# Patient Record
Sex: FEMALE | Race: WHITE | Employment: FULL TIME | URBAN - METROPOLITAN AREA
[De-identification: names, ages, dates, MRNs, and addresses within clinical notes are randomized per-mention and may not be internally consistent; named-entity substitution may affect disease eponyms.]

---

## 2022-03-18 PROBLEM — L90.0 LICHEN SCLEROSUS: Status: ACTIVE | Noted: 2021-10-22

## 2022-06-02 ENCOUNTER — PATIENT MESSAGE (OUTPATIENT)
Dept: OBGYN CLINIC | Age: 22
End: 2022-06-02

## 2022-06-08 ENCOUNTER — OFFICE VISIT (OUTPATIENT)
Dept: OBGYN CLINIC | Age: 22
End: 2022-06-08
Payer: MEDICAID

## 2022-06-08 VITALS
SYSTOLIC BLOOD PRESSURE: 120 MMHG | DIASTOLIC BLOOD PRESSURE: 68 MMHG | BODY MASS INDEX: 26.5 KG/M2 | WEIGHT: 135 LBS | HEIGHT: 60 IN

## 2022-06-08 DIAGNOSIS — N92.6 MISSED MENSES: Primary | ICD-10-CM

## 2022-06-08 DIAGNOSIS — Z3A.08 8 WEEKS GESTATION OF PREGNANCY: ICD-10-CM

## 2022-06-08 LAB
ABO + RH BLD: NORMAL
BLOOD GROUP ANTIBODIES SERPL: NORMAL

## 2022-06-08 PROCEDURE — 99214 OFFICE O/P EST MOD 30 MIN: CPT | Performed by: OBSTETRICS & GYNECOLOGY

## 2022-06-08 PROCEDURE — 76830 TRANSVAGINAL US NON-OB: CPT | Performed by: OBSTETRICS & GYNECOLOGY

## 2022-06-08 ASSESSMENT — ENCOUNTER SYMPTOMS
EYES NEGATIVE: 1
RESPIRATORY NEGATIVE: 1
SHORTNESS OF BREATH: 0
GASTROINTESTINAL NEGATIVE: 1
ABDOMINAL PAIN: 0
ALLERGIC/IMMUNOLOGIC NEGATIVE: 1
COUGH: 0
BLOOD IN STOOL: 0

## 2022-06-08 NOTE — PROGRESS NOTES
Michi Guzman presents for pregnancy confirmation and establish CARMEL. Patient's last menstrual period was 04/01/2022 (approximate). , she reports regular,  cycles. This pregnancy was planned. Just stopped OCs in March. Works at [employer], just got a new job in [place] which is closer to her home. Her Ob history is as follows:  # 1 - Date: None, Sex: None, Weight: None, GA: None, Delivery: None, Apgar1: None, Apgar5: None, Living: None, Birth Comments: None      Past medical History: Active Ambulatory Problems     Diagnosis Date Noted    Lichen sclerosus 04/10/8802     Resolved Ambulatory Problems     Diagnosis Date Noted    No Resolved Ambulatory Problems     Past Medical History:   Diagnosis Date    Abnormal Papanicolaou smear of cervix 07/17/2017    HSV-2 (herpes simplex virus 2) infection        Current Outpatient Medications on File Prior to Visit   Medication Sig Dispense Refill    Prenatal MV & Min w/FA-DHA (ONE A DAY PRENATAL PO) Take by mouth      acyclovir (ZOVIRAX) 800 MG tablet Take 800 mg by mouth 5 times daily (Patient not taking: Reported on 6/8/2022)       No current facility-administered medications on file prior to visit. Past Surgical:  has a past surgical history that includes Tonsillectomy and LEEP (09/2017). Nigel Hull  reports that she has never smoked. She has never used smokeless tobacco. She reports previous alcohol use. She reports that she does not use drugs. Review of Systems   Constitutional: Negative. Negative for unexpected weight change. HENT: Negative. Eyes: Negative. Respiratory: Negative. Negative for cough and shortness of breath. Cardiovascular: Negative. Negative for chest pain and palpitations. Gastrointestinal: Negative. Negative for abdominal pain and blood in stool. Endocrine: Negative. Genitourinary: Negative. Negative for difficulty urinating, dysuria, menstrual problem, pelvic pain and urgency. Musculoskeletal: Negative. Skin: Negative. Allergic/Immunologic: Negative. Neurological: Negative. Hematological: Negative. Psychiatric/Behavioral: Negative. Negative for behavioral problems and confusion. All other systems reviewed and are negative. Blood pressure 120/68, height 5' (1.524 m), weight 135 lb (61.2 kg), last menstrual period 04/01/2022. Body mass index is 26.37 kg/m². Physical Exam  Constitutional:       General: She is not in acute distress. Appearance: Normal appearance. HENT:      Head: Normocephalic and atraumatic. Eyes:      Extraocular Movements: Extraocular movements intact. Pupils: Pupils are equal, round, and reactive to light. Pulmonary:      Effort: Pulmonary effort is normal. No respiratory distress. Skin:     General: Skin is warm and dry. Neurological:      General: No focal deficit present. Mental Status: She is alert and oriented to person, place, and time. Psychiatric:         Mood and Affect: Mood normal.         Behavior: Behavior normal.         Thought Content: Thought content normal.         Judgment: Judgment normal.           [address] - OB ULTRASOUND REPORT     Patient Information  6/8/2022  Mike Preciado 2000     22 y.o.    [facility]: Patient's last menstrual period was 04/01/2022 (approximate). GA by LMP:  9 Weeks   5 Days    Indication for this exam: Confirmation of Pregnancy and CARMEL    Findings: Images reviewed by me personally. Number of Fetuses: 1  Cardiac Rate: 172 bpm    GA by US:   8 Wks: 5 Days:    Other pertinent findings: CL>4cm    Summary:  Intrauterine Pregnancy    Estimated GA: 8 Wks  5 Days  Recommended EDC: January 13th, 2023      Azalea Jacobsen was seen today for amenorrhea. Diagnoses and all orders for this visit:    Missed menses  -     AMB POC US, TRANSVAGINAL    8 weeks gestation of pregnancy - change CARMEL to 1/13/23  -     ABO/Rh; Future  -     Antibody Screen;  Future  -     CBC with Auto Differential; Future  -     RPR; Future  -     Hepatitis B Surface Antigen; Future  -     Rubella antibody, IgG; Future  -     HIV 1/2 Ag/Ab, 4TH Generation,W Rflx Confirm; Future  -     Culture, Urine; Future  -     Varicella Zoster Antibody, IgG; Future          -PN labs today  -Continue PNV with at least 125WYL Folic acid QD  -F6/PWPWAJ (Diclegis) if nausea/vomiting  -Calcium:  recommend 1000mg/QD  (500 in 2 Tums), milk, cheese, yogurt, leafy green vegetables  -Iron:  30mg every day (red meat, dark beans)  -Counseled on appropriate foods to avoid in pregnancy:   -unpasteurized milk/cheeses   -hot dogs, luncheon meats, cold cuts unless heated until steaming    -refrigerated correia and meat spreads   -raw/undercooked seafood, eggs, meat  -Avoid litter box if have cat(s) , avoid handling game meat  -See follow up instructions     Return in about 1 week (around 6/15/2022) for NOB w RN --- DIRK lester/ MD in 4 weeks.          Physician: Sommer Arceo MD  June 8, 2022  11:35 AM

## 2022-06-10 LAB — VZV IGG SER IA-ACNC: 900 INDEX

## 2022-06-11 LAB
BACTERIA SPEC CULT: NORMAL
SERVICE CMNT-IMP: NORMAL

## 2022-06-12 LAB
BASOPHILS # BLD: 0.1 K/UL (ref 0–0.2)
BASOPHILS NFR BLD: 1 % (ref 0–2)
DIFFERENTIAL METHOD BLD: ABNORMAL
EOSINOPHIL # BLD: 0.2 K/UL (ref 0–0.8)
EOSINOPHIL NFR BLD: 2 % (ref 0.5–7.8)
ERYTHROCYTE [DISTWIDTH] IN BLOOD BY AUTOMATED COUNT: 12.7 % (ref 11.9–14.6)
HBV SURFACE AG SER QL: NONREACTIVE
HCT VFR BLD AUTO: 39.8 % (ref 35.8–46.3)
HGB BLD-MCNC: 13.4 G/DL (ref 11.7–15.4)
HIV 1+2 AB+HIV1 P24 AG SERPL QL IA: NONREACTIVE
HIV 1/2 RESULT COMMENT: NORMAL
IMM GRANULOCYTES # BLD AUTO: 0.1 K/UL (ref 0–0.5)
IMM GRANULOCYTES NFR BLD AUTO: 1 % (ref 0–5)
LYMPHOCYTES # BLD: 1.8 K/UL (ref 0.5–4.6)
LYMPHOCYTES NFR BLD: 23 % (ref 13–44)
MCH RBC QN AUTO: 31.2 PG (ref 26.1–32.9)
MCHC RBC AUTO-ENTMCNC: 33.7 G/DL (ref 31.4–35)
MCV RBC AUTO: 92.8 FL (ref 79.6–97.8)
MONOCYTES # BLD: 0.5 K/UL (ref 0.1–1.3)
MONOCYTES NFR BLD: 7 % (ref 4–12)
NEUTS SEG # BLD: 5.2 K/UL (ref 1.7–8.2)
NEUTS SEG NFR BLD: 66 % (ref 43–78)
NRBC # BLD: 0 K/UL (ref 0–0.2)
PLATELET # BLD AUTO: 152 K/UL (ref 150–450)
PMV BLD AUTO: 12.8 FL (ref 9.4–12.3)
RBC # BLD AUTO: 4.29 M/UL (ref 4.05–5.2)
RPR SER QL: NONREACTIVE
RUBV IGG SERPL IA-ACNC: 43.8 IU/ML (ref 0–50)
WBC # BLD AUTO: 7.9 K/UL (ref 4.3–11.1)

## 2022-06-17 ENCOUNTER — NURSE ONLY (OUTPATIENT)
Dept: OBGYN CLINIC | Age: 22
End: 2022-06-17

## 2022-06-17 VITALS — WEIGHT: 136 LBS | BODY MASS INDEX: 26.56 KG/M2

## 2022-06-17 NOTE — PROGRESS NOTES
NOB consult with pt. Labs today: nipt. Offered pt the option of CF, SMA, and Fragile X carrier testing. Pt declines testing. Offered pt the option of genetic screening (1st screen vs Tetra vs nipt). Pt desires nipt. Drawn and sent to [facility]. Instructed pt on exercise/nutrition in pregnancy. Reviewed [facility] preg book. Advised pt on using [facility] for hospital needs and [facility] L&D for pregnancy related emergencies. Pt states understanding. NOB forms signed, scanned, and given to pt. Medical Hx: abnormal pap, genital herpes    Surgical Hx: LEEP, tonsillectomy    Last Pap: 10/22/21  WNL    Pt OB c/o: none.

## 2022-07-06 NOTE — TELEPHONE ENCOUNTER
I spoke with [facility] and they did not have enough DNA to run the sample. She will need to have it redrawn unfortunately.

## 2022-07-14 ENCOUNTER — ROUTINE PRENATAL (OUTPATIENT)
Dept: OBGYN CLINIC | Age: 22
End: 2022-07-14
Payer: MEDICAID

## 2022-07-14 VITALS — WEIGHT: 142 LBS | DIASTOLIC BLOOD PRESSURE: 76 MMHG | BODY MASS INDEX: 27.73 KG/M2 | SYSTOLIC BLOOD PRESSURE: 124 MMHG

## 2022-07-14 DIAGNOSIS — Z34.02 ENCOUNTER FOR PRENATAL CARE IN SECOND TRIMESTER OF FIRST PREGNANCY: Primary | ICD-10-CM

## 2022-07-14 DIAGNOSIS — Z11.3 SCREENING FOR STD (SEXUALLY TRANSMITTED DISEASE): ICD-10-CM

## 2022-07-14 DIAGNOSIS — Z34.01 ENCOUNTER FOR SUPERVISION OF NORMAL FIRST PREGNANCY IN FIRST TRIMESTER: ICD-10-CM

## 2022-07-14 PROBLEM — Z34.00 NORMAL PREGNANCY, FIRST: Status: ACTIVE | Noted: 2022-07-14

## 2022-07-14 LAB — NIPT, EXTERNAL: NORMAL

## 2022-07-14 PROCEDURE — 99204 OFFICE O/P NEW MOD 45 MIN: CPT | Performed by: OBSTETRICS & GYNECOLOGY

## 2022-07-14 NOTE — PROGRESS NOTES
Carli Sanders  presents for [address] [place] [address] w/ GC/CT/T. . 13w6d. PL and MFM notes reviewed as applicable. Taking Prenatal Vitamins: Yes  She is noticing:  no unusual complaints. Repeat NIPT today due to not enough DNA with last sample.    NOB today    Normal pregnancy, first   Well-controlled, continue current medications

## 2022-07-16 LAB
C TRACH RRNA SPEC QL NAA+PROBE: NEGATIVE
N GONORRHOEA RRNA SPEC QL NAA+PROBE: NEGATIVE
SPECIMEN SOURCE: NORMAL
T VAGINALIS RRNA SPEC QL NAA+PROBE: NEGATIVE

## 2022-07-26 DIAGNOSIS — Z34.02 ENCOUNTER FOR SUPERVISION OF NORMAL FIRST PREGNANCY IN SECOND TRIMESTER: Primary | ICD-10-CM

## 2022-08-23 ENCOUNTER — ROUTINE PRENATAL (OUTPATIENT)
Dept: OBGYN CLINIC | Age: 22
End: 2022-08-23
Payer: MEDICAID

## 2022-08-23 VITALS — WEIGHT: 151 LBS | BODY MASS INDEX: 29.49 KG/M2 | SYSTOLIC BLOOD PRESSURE: 118 MMHG | DIASTOLIC BLOOD PRESSURE: 70 MMHG

## 2022-08-23 DIAGNOSIS — A60.1 HERPES SIMPLEX INFECTION OF PERIANAL SKIN: ICD-10-CM

## 2022-08-23 DIAGNOSIS — Z34.02 ENCOUNTER FOR SUPERVISION OF NORMAL FIRST PREGNANCY IN SECOND TRIMESTER: ICD-10-CM

## 2022-08-23 DIAGNOSIS — Z36.89 ENCOUNTER FOR FETAL ANATOMIC SURVEY: Primary | ICD-10-CM

## 2022-08-23 PROCEDURE — 99213 OFFICE O/P EST LOW 20 MIN: CPT | Performed by: OBSTETRICS & GYNECOLOGY

## 2022-08-23 PROCEDURE — 76805 OB US >/= 14 WKS SNGL FETUS: CPT | Performed by: OBSTETRICS & GYNECOLOGY

## 2022-08-23 RX ORDER — ACYCLOVIR 400 MG/1
400 TABLET ORAL
Qty: 50 TABLET | Refills: 0 | Status: SHIPPED | OUTPATIENT
Start: 2022-08-23 | End: 2022-09-02

## 2022-08-23 NOTE — ASSESSMENT & PLAN NOTE
At goal, continue current medications, continue current treatment plan and allie incomplete, repeat @ [facility]

## 2022-09-21 ENCOUNTER — ROUTINE PRENATAL (OUTPATIENT)
Dept: OBGYN CLINIC | Age: 22
End: 2022-09-21
Payer: MEDICAID

## 2022-09-21 VITALS — BODY MASS INDEX: 31.05 KG/M2 | DIASTOLIC BLOOD PRESSURE: 60 MMHG | WEIGHT: 159 LBS | SYSTOLIC BLOOD PRESSURE: 112 MMHG

## 2022-09-21 DIAGNOSIS — Z36.2 ENCOUNTER FOR FOLLOW-UP ULTRASOUND OF FETAL ANATOMY: ICD-10-CM

## 2022-09-21 DIAGNOSIS — Z3A.23 23 WEEKS GESTATION OF PREGNANCY: ICD-10-CM

## 2022-09-21 DIAGNOSIS — Z34.02 ENCOUNTER FOR SUPERVISION OF NORMAL FIRST PREGNANCY IN SECOND TRIMESTER: Primary | ICD-10-CM

## 2022-09-21 PROCEDURE — 76816 OB US FOLLOW-UP PER FETUS: CPT | Performed by: OBSTETRICS & GYNECOLOGY

## 2022-09-21 PROCEDURE — 99213 OFFICE O/P EST LOW 20 MIN: CPT | Performed by: OBSTETRICS & GYNECOLOGY

## 2022-09-21 RX ORDER — FERROUS SULFATE 137(45) MG
142 TABLET, EXTENDED RELEASE ORAL DAILY
Qty: 30 TABLET | Refills: 5 | Status: SHIPPED | OUTPATIENT
Start: 2022-09-21

## 2022-09-21 NOTE — PROGRESS NOTES
Carli Sanders  presents for [address]. . 23w5d. PL and MFM notes reviewed as applicable.   Taking Prenatal Vitamins: yes   She is noticing:  no unusual complaints    Normal pregnancy, first   At goal, continue current medications, continue current treatment plan and allie complete

## 2022-10-19 ENCOUNTER — ROUTINE PRENATAL (OUTPATIENT)
Dept: OBGYN CLINIC | Age: 22
End: 2022-10-19
Payer: MEDICAID

## 2022-10-19 VITALS — DIASTOLIC BLOOD PRESSURE: 82 MMHG | BODY MASS INDEX: 31.64 KG/M2 | WEIGHT: 162 LBS | SYSTOLIC BLOOD PRESSURE: 132 MMHG

## 2022-10-19 DIAGNOSIS — Z13.1 SCREENING FOR DIABETES MELLITUS (DM): ICD-10-CM

## 2022-10-19 DIAGNOSIS — Z34.02 ENCOUNTER FOR SUPERVISION OF NORMAL FIRST PREGNANCY IN SECOND TRIMESTER: Primary | ICD-10-CM

## 2022-10-19 DIAGNOSIS — Z13.0 SCREENING, ANEMIA, DEFICIENCY, IRON: ICD-10-CM

## 2022-10-19 LAB
ERYTHROCYTE [DISTWIDTH] IN BLOOD BY AUTOMATED COUNT: 13.2 % (ref 11.9–14.6)
GLUCOSE 1 HOUR: 105 MG/DL
HCT VFR BLD AUTO: 38.6 % (ref 35.8–46.3)
HGB BLD-MCNC: 12.5 G/DL (ref 11.7–15.4)
MCH RBC QN AUTO: 32.4 PG (ref 26.1–32.9)
MCHC RBC AUTO-ENTMCNC: 32.4 G/DL (ref 31.4–35)
MCV RBC AUTO: 100 FL (ref 82–102)
NRBC # BLD: 0 K/UL (ref 0–0.2)
PLATELET # BLD AUTO: 123 K/UL (ref 150–450)
PMV BLD AUTO: 13 FL (ref 9.4–12.3)
RBC # BLD AUTO: 3.86 M/UL (ref 4.05–5.2)
WBC # BLD AUTO: 9.6 K/UL (ref 4.3–11.1)

## 2022-10-19 PROCEDURE — 99213 OFFICE O/P EST LOW 20 MIN: CPT | Performed by: OBSTETRICS & GYNECOLOGY

## 2022-10-19 NOTE — LETTER
[address] [address]  Phone: [phone]  Fax: [phone]    Jie Mcdonough MD        October 19, 2022     Patient: Diana Vega   YOB: 2000   Date of Visit: 10/19/2022       To Whom it May Concern:    Diana Vega was seen in my clinic on 10/19/2022. She may return to work on 10/20/22. If you have any questions or concerns, please don't hesitate to call.     Sincerely,         Jie Mcdonough MD

## 2022-10-19 NOTE — PROGRESS NOTES
Jessica Petersen  presents for [address]. . 27w5d. PL and MFM notes reviewed as applicable. Taking Prenatal Vitamins: Yes  She is noticing:  Pt reports hives on inner thighs and face; random onset. Face has resolved. Has stopped using acne preps. Thighs come and go, usually wears scrubs, uses unscented detergent and no fabric softener. REc cortisone + lubriderm to thighs prn. Use mild soap eg sensitive Dove soap.     Normal pregnancy, first   noted

## 2022-10-27 ENCOUNTER — TELEPHONE (OUTPATIENT)
Dept: OBGYN CLINIC | Age: 22
End: 2022-10-27

## 2022-10-27 NOTE — TELEPHONE ENCOUNTER
Pt called stating she was jumping to get something out of the cabinet at work and hit her abdomen on the counter. Pt denies vaginal bleeding, LOF, contractions and/or abdominal pain. Pt reports good fetal movement. Pt states she \"feels fine. \" Precautions reviewed, kick counts encouraged. Pt voiced understanding.

## 2022-11-03 ENCOUNTER — ROUTINE PRENATAL (OUTPATIENT)
Dept: OBGYN CLINIC | Age: 22
End: 2022-11-03
Payer: MEDICAID

## 2022-11-03 VITALS — DIASTOLIC BLOOD PRESSURE: 74 MMHG | BODY MASS INDEX: 31.83 KG/M2 | WEIGHT: 163 LBS | SYSTOLIC BLOOD PRESSURE: 122 MMHG

## 2022-11-03 DIAGNOSIS — Z34.03 ENCOUNTER FOR SUPERVISION OF NORMAL FIRST PREGNANCY IN THIRD TRIMESTER: Primary | ICD-10-CM

## 2022-11-03 PROCEDURE — 99213 OFFICE O/P EST LOW 20 MIN: CPT | Performed by: OBSTETRICS & GYNECOLOGY

## 2022-11-03 NOTE — PROGRESS NOTES
Gabygail Lovett  presents for [address]. . 29w6d. PL and MFM notes reviewed as applicable. Taking Prenatal Vitamins: Yes  She is noticing:  BH contractions and fatigue. Night shift getting very difficult () due to limited staff. Having to leave early. Will mayra get approval to move to first shift.     Normal pregnancy, first   At goal, continue current treatment plan

## 2022-11-16 ENCOUNTER — ROUTINE PRENATAL (OUTPATIENT)
Dept: OBGYN CLINIC | Age: 22
End: 2022-11-16
Payer: MEDICAID

## 2022-11-16 VITALS
HEIGHT: 60 IN | SYSTOLIC BLOOD PRESSURE: 110 MMHG | BODY MASS INDEX: 32.79 KG/M2 | WEIGHT: 167 LBS | DIASTOLIC BLOOD PRESSURE: 60 MMHG

## 2022-11-16 DIAGNOSIS — Z3A.31 31 WEEKS GESTATION OF PREGNANCY: ICD-10-CM

## 2022-11-16 DIAGNOSIS — Z34.03 ENCOUNTER FOR SUPERVISION OF NORMAL FIRST PREGNANCY IN THIRD TRIMESTER: Primary | ICD-10-CM

## 2022-11-16 PROCEDURE — 99213 OFFICE O/P EST LOW 20 MIN: CPT | Performed by: OBSTETRICS & GYNECOLOGY

## 2022-11-16 NOTE — PROGRESS NOTES
Julia May  presents for [address]. [address]. PL and MFM notes reviewed as applicable.   Taking Prenatal Vitamins: Yes  She is noticing:  no unusual complaints    Normal pregnancy, first   Well-controlled, continue current treatment plan

## 2022-11-22 ENCOUNTER — HOSPITAL ENCOUNTER (OUTPATIENT)
Age: 22
Discharge: HOME | End: 2022-11-22
Attending: OBSTETRICS & GYNECOLOGY | Admitting: OBSTETRICS & GYNECOLOGY
Payer: MEDICAID

## 2022-11-22 VITALS — DIASTOLIC BLOOD PRESSURE: 65 MMHG | HEART RATE: 88 BPM | SYSTOLIC BLOOD PRESSURE: 110 MMHG

## 2022-11-22 PROCEDURE — 59025 FETAL NON-STRESS TEST: CPT

## 2022-11-22 PROCEDURE — 99284 EMERGENCY DEPT VISIT MOD MDM: CPT

## 2022-11-22 NOTE — ED TRIAGE NOTES
OB ED Provider Note    Name: Tho Mackay MRN: [id number]     YOB: 2000  Age: 23 y.o. Sex: female      Subjective:     Reason for Presentation to [facility] ED:  vaginal spotting    History of Present Illness: Ms. López Pollock is a 23 y.o.  at [address] [facility] gestation with Estimated Date of Delivery: 23. Her current obstetrical history is significant for uncomplicated pregnancy. Prenatal records reviewed. Had some spotting last night and this morning only with wiping. Also had pink urine this morning. She reports good fetal movement. She denies leakage of fluid. She denies contractions. OB History    Para Term  AB Living   1   0 0 0 0   SAB IAB Ectopic Molar Multiple Live Births                    # Outcome Date GA Lbr Ashok/2nd Weight Sex Delivery Anes PTL Lv   1 Current              Past Medical History:   Diagnosis Date    Abnormal Papanicolaou smear of cervix 2017    LGSIL    HSV-2 (herpes simplex virus 2) infection      Past Surgical History:   Procedure Laterality Date    LEEP  2017    TONSILLECTOMY       Social History     Occupational History    Not on file   Tobacco Use    Smoking status: Never    Smokeless tobacco: Never   Substance and Sexual Activity    Alcohol use: Not Currently    Drug use: No    Sexual activity: Yes     Partners: Male     Birth control/protection: I.U.D. Allergies   Allergen Reactions    Latex Rash    Hops Oil Other (See Comments)     Affects kidneys    Penicillins Rash     Prior to Admission medications    Medication Sig Start Date End Date Taking?  Authorizing Provider   ferrous sulfate (SLOW FE) 142 (45 Fe) MG extended release tablet Take 142 mg by mouth daily 22   Nitish Larkin MD   Prenatal MV & Min w/FA-DHA (ONE A DAY PRENATAL PO) Take by mouth    Historical Provider, MD          Objective:     Physical Exam:  VITALS:  /65   Pulse 88   LMP 2022 (Approximate)     CONSTITUTIONAL:  awake, alert, cooperative, no apparent distress, and appears stated age  FHTs: Reactive and reassuring and Category I strip  Uterine activity/Contractions: Uterine irritability, but no regular contractions  Membranes: intact  Cervix: closed, thick effaced, [place]      Urine dip: No evidence of infection      Assessment:  32w4d gestation  Not in labor; Vaginal spotting resolved   Reassuring fetal status      Plan:  Discharge home, follow-up at next regular OB appointment

## 2022-11-22 NOTE — PROGRESS NOTES
[facility]                  Patient: Sameer Garzon    YOB: 2000    Date of Visit: 11/22/22        To Whom It May Concern:    Patient was seen at [address] [place] [address] Emergency Department  on 11/22/2022 due to medical issues. May return to work on 11/27/22 per Dr. Ashley Smoker. Orders to rest this week and hydrate. If you have any questions or concerns, please don't hesitate to call.     Sincerely,    Alvaro Ivey RN

## 2022-11-22 NOTE — PROGRESS NOTES
Patient discharged home per MD order. Discharge instructions completed and patient verbalized understanding. Questions encouraged. Accompanied by friend. Stable at discharge.

## 2022-11-22 NOTE — DISCHARGE INSTRUCTIONS
Pregnancy Precautions: Care Instructions  Your Care Instructions     There is no sure way to prevent labor before your due date ( labor) or to prevent most other pregnancy problems. But there are things you can do to increase your chances of a healthy pregnancy. Go to your appointments, follow your doctor's advice, and take good care of yourself. Eat well, and exercise (if your doctor agrees). And make sure to drink plenty of water. Follow-up care is a key part of your treatment and safety. Be sure to make and go to all appointments, and call your doctor if you are having problems. It's also a good idea to know your test results and keep a list of the medicines you take. How can you care for yourself at home? Make sure you go to your prenatal appointments. At each visit, your doctor will check your blood pressure. Your doctor will also check to see if you have protein in your urine. High blood pressure and protein in urine are signs of preeclampsia. This condition can be dangerous for you and your baby. Drink plenty of fluids. Dehydration can cause contractions. If you have kidney, heart, or liver disease and have to limit fluids, talk with your doctor before you increase the amount of fluids you drink. Tell your doctor right away if you notice any symptoms of an infection, such as:  Burning when you urinate. A foul-smelling discharge from your vagina. Vaginal itching. Unexplained fever. Unusual pain or soreness in your uterus or lower belly. Eat a balanced diet. Include plenty of foods that are high in calcium and iron. Foods high in calcium include milk, cheese, yogurt, almonds, and broccoli. Foods high in iron include red meat, shellfish, poultry, eggs, beans, raisins, whole-grain bread, and leafy green vegetables. Do not smoke. If you need help quitting, talk to your doctor about stop-smoking programs and medicines. These can increase your chances of quitting for good.   Do not drink alcohol or use marijuana or illegal drugs. Follow your doctor's directions about activity. Your doctor will let you know how much, if any, exercise you can do. Ask your doctor if you can have sex. If you are at risk for early labor, your doctor may ask you to not have sex. Take care to prevent falls. During pregnancy, your joints are loose, and your balance is off. Sports such as bicycling, skiing, or in-line skating can increase your risk of falling. And don't ride horses or motorcycles, dive, water ski, scuba dive, or parachute jump while you are pregnant. Avoid things that can make your body too hot and may be harmful to your baby, such as a hot tub or sauna. Or talk with your doctor before doing anything that raises your body temperature. Your doctor can tell you if it's safe. Do not take any over-the-counter or herbal medicines or supplements without talking to your doctor or pharmacist first.  When should you call for help? Call 911  anytime you think you may need emergency care. For example, call if:    You passed out (lost consciousness). You have a seizure. You have severe vaginal bleeding. You have severe pain in your belly or pelvis. You have had fluid gushing or leaking from your vagina and you know or think the umbilical cord is bulging into your vagina. If this happens, immediately get down on your knees so your rear end (buttocks) is higher than your head. This will decrease the pressure on the cord until help arrives. Call your doctor now or seek immediate medical care if:    You have signs of preeclampsia, such as:  Sudden swelling of your face, hands, or feet. New vision problems (such as dimness, blurring, or seeing spots). A severe headache. You have any vaginal bleeding. You have belly pain or cramping. You have a fever. You have had regular contractions (with or without pain) for an hour.  This means that you have 8 or more within 1 hour or 4 or more in 20 minutes after you change your position and drink fluids. You have a sudden release of fluid from your vagina. You have low back pain or pelvic pressure that does not go away. You notice that your baby has stopped moving or is moving much less than normal.   Watch closely for changes in your health, and be sure to contact your doctor if you have any problems. Where can you learn more? Go to https://chpepiceweb.healthUnomy. org and sign in to your [employer] account. Enter 8452-0397153 in the [employer] box to learn more about \"Pregnancy Precautions: Care Instructions. \"     If you do not have an account, please click on the \"Sign Up Now\" link. Current as of: February 23, 2022               Content Version: 13.4  © 2006-2022 Healthwise, Incorporated. Care instructions adapted under license by [facility] ([facility]). If you have questions about a medical condition or this instruction, always ask your healthcare professional. [place] [address] any warranty or liability for your use of this information.

## 2022-11-29 ENCOUNTER — TELEPHONE (OUTPATIENT)
Dept: OBGYN CLINIC | Age: 22
End: 2022-11-29

## 2022-11-29 NOTE — TELEPHONE ENCOUNTER
Pt called with questions regarding future appointments. Appointment date and times confirmed. Pt voiced understanding.

## 2022-11-30 ENCOUNTER — HOSPITAL ENCOUNTER (OUTPATIENT)
Age: 22
Discharge: HOSPICE | End: 2022-11-30
Attending: OBSTETRICS & GYNECOLOGY | Admitting: OBSTETRICS & GYNECOLOGY
Payer: MEDICAID

## 2022-11-30 VITALS
TEMPERATURE: 98.3 F | DIASTOLIC BLOOD PRESSURE: 60 MMHG | HEART RATE: 97 BPM | SYSTOLIC BLOOD PRESSURE: 105 MMHG | RESPIRATION RATE: 18 BRPM

## 2022-11-30 PROBLEM — Z87.42 HISTORY OF VAGINAL BLEEDING: Status: ACTIVE | Noted: 2022-11-30

## 2022-11-30 PROCEDURE — 99284 EMERGENCY DEPT VISIT MOD MDM: CPT

## 2022-11-30 PROCEDURE — 59025 FETAL NON-STRESS TEST: CPT

## 2022-11-30 NOTE — DISCHARGE INSTRUCTIONS
Pregnancy Precautions: Care Instructions  Your Care Instructions     There is no sure way to prevent labor before your due date ( labor) or to prevent most other pregnancy problems. But there are things you can do to increase your chances of a healthy pregnancy. Go to your appointments, follow your doctor's advice, and take good care of yourself. Eat well, and exercise (if your doctor agrees). And make sure to drink plenty of water. Follow-up care is a key part of your treatment and safety. Be sure to make and go to all appointments, and call your doctor if you are having problems. It's also a good idea to know your test results and keep a list of the medicines you take. How can you care for yourself at home? Make sure you go to your prenatal appointments. At each visit, your doctor will check your blood pressure. Your doctor will also check to see if you have protein in your urine. High blood pressure and protein in urine are signs of preeclampsia. This condition can be dangerous for you and your baby. Drink plenty of fluids. Dehydration can cause contractions. If you have kidney, heart, or liver disease and have to limit fluids, talk with your doctor before you increase the amount of fluids you drink. Tell your doctor right away if you notice any symptoms of an infection, such as:  Burning when you urinate. A foul-smelling discharge from your vagina. Vaginal itching. Unexplained fever. Unusual pain or soreness in your uterus or lower belly. Eat a balanced diet. Include plenty of foods that are high in calcium and iron. Foods high in calcium include milk, cheese, yogurt, almonds, and broccoli. Foods high in iron include red meat, shellfish, poultry, eggs, beans, raisins, whole-grain bread, and leafy green vegetables. Do not smoke. If you need help quitting, talk to your doctor about stop-smoking programs and medicines. These can increase your chances of quitting for good.   Do not drink alcohol or use marijuana or illegal drugs. Follow your doctor's directions about activity. Your doctor will let you know how much, if any, exercise you can do. Ask your doctor if you can have sex. If you are at risk for early labor, your doctor may ask you to not have sex. Take care to prevent falls. During pregnancy, your joints are loose, and your balance is off. Sports such as bicycling, skiing, or in-line skating can increase your risk of falling. And don't ride horses or motorcycles, dive, water ski, scuba dive, or parachute jump while you are pregnant. Avoid things that can make your body too hot and may be harmful to your baby, such as a hot tub or sauna. Or talk with your doctor before doing anything that raises your body temperature. Your doctor can tell you if it's safe. Do not take any over-the-counter or herbal medicines or supplements without talking to your doctor or pharmacist first.  When should you call for help? Call 911  anytime you think you may need emergency care. For example, call if:    You passed out (lost consciousness). You have a seizure. You have severe vaginal bleeding. You have severe pain in your belly or pelvis. You have had fluid gushing or leaking from your vagina and you know or think the umbilical cord is bulging into your vagina. If this happens, immediately get down on your knees so your rear end (buttocks) is higher than your head. This will decrease the pressure on the cord until help arrives. Call your doctor now or seek immediate medical care if:    You have signs of preeclampsia, such as:  Sudden swelling of your face, hands, or feet. New vision problems (such as dimness, blurring, or seeing spots). A severe headache. You have any vaginal bleeding. You have belly pain or cramping. You have a fever. You have had regular contractions (with or without pain) for an hour.  This means that you have 8 or more within 1 hour or 4 or more in 20 minutes after you change your position and drink fluids. You have a sudden release of fluid from your vagina. You have low back pain or pelvic pressure that does not go away. You notice that your baby has stopped moving or is moving much less than normal.   Watch closely for changes in your health, and be sure to contact your doctor if you have any problems. Where can you learn more? Go to https://chpepiceweb.healthBridj. org and sign in to your [employer] account. Enter 7726-9378800 in the [employer] box to learn more about \"Pregnancy Precautions: Care Instructions. \"     If you do not have an account, please click on the \"Sign Up Now\" link. Current as of: February 23, 2022               Content Version: 13.4  © 2006-2022 Healthwise, Incorporated. Care instructions adapted under license by [facility] ([facility]). If you have questions about a medical condition or this instruction, always ask your healthcare professional. [place] [address] any warranty or liability for your use of this information.

## 2022-11-30 NOTE — H&P
Chief Complaint   Patient presents with    Routine Prenatal Visit       23 y.o. female  at 33w5d  weeks gestation who requests evaluation for fetal well being. Was seen in [facility] on  for spotting with wiping ?postcoital bleeding and had some irregular contractions. Has not had recurrence of sx. Was to have ob appt today but it was r/s to  and she wanted to be seen. Her pregnancy issues include: none    Fetal movement has beennormal .    HISTORY:  OB History    Para Term  AB Living   1   0 0 0 0   SAB IAB Ectopic Molar Multiple Live Births                    # Outcome Date GA Lbr Ashok/2nd Weight Sex Delivery Anes PTL Lv   1 Current                Past Surgical History:   Procedure Laterality Date    LEEP  2017    TONSILLECTOMY         Past Medical History:   Diagnosis Date    Abnormal Papanicolaou smear of cervix 2017    LGSIL    HSV-2 (herpes simplex virus 2) infection        Allergies   Allergen Reactions    Latex Rash    Hops Oil Other (See Comments)     Affects kidneys    Penicillins Rash       Family History   Problem Relation Age of Onset    Hypertension Maternal Grandmother     Colon Cancer Neg Hx     Breast Cancer Neg Hx     Ovarian Cancer Neg Hx     Diabetes Neg Hx        Social History     Socioeconomic History    Marital status: Single     Spouse name: Not on file    Number of children: Not on file    Years of education: Not on file    Highest education level: Not on file   Occupational History    Not on file   Tobacco Use    Smoking status: Never    Smokeless tobacco: Never   Substance and Sexual Activity    Alcohol use: Not Currently    Drug use: No    Sexual activity: Yes     Partners: Male     Birth control/protection: I.U.D.    Other Topics Concern    Not on file   Social History Narrative    Not on file     Social Determinants of Health     Financial Resource Strain: Not on file   Food Insecurity: Not on file   Transportation Needs: Not on file   Physical Activity: Not on file   Stress: Not on file   Social Connections: Not on file   Intimate Partner Violence: Not on file   Housing Stability: Not on file       ROS:  Negative:   negative 10 point ROS except as noted in HPI    Positive:   per hpi    PHYSICAL EXAM:  Blood pressure 105/60, pulse 97, temperature 98.3 °F (36.8 °C), temperature source Oral, resp. rate 18, last menstrual period 04/01/2022. The patient appears well, alert, oriented x 3. Appropriate affect. Lungs are clear. Heart RRR, no murmurs. Abdomen soft, non-tender, no rebound/guarding, normoactive bs. Fundus soft and non tender  Skin warm, dry, no rashes  Ext tr edema, DTR's normal    Cervix: deferred    Fetal Heart Rate: Reactive  Baseline: 135 per minute  Variability: moderate  Accelerations: yes  Decelerations: none  Uterine contractions: occasional   I personally reviewed and interpreted the FHR tracing      I have personally reviewed the patient's history, prenatal record, and pertinent test results. vital sign trends, radiology reports, laboratory results, and previous provider notes support my clinical impression. Assessment:  23 y.o. female at 33w5d  Hx of vaginal spotting, resolved. Reassuring fetal status    Plan:  Findings and test results were discussed. Warning signs given. Patient to contact ob about moving up appointment.   Time spent with patient consistent with level of care    Signed By:  Claribel Guzman MD     November 30, 2022

## 2022-11-30 NOTE — PROGRESS NOTES
Pt to room [facility] for triage routine prenatal visit due to office cancelling her appointment today. Assessment begins, EFM and Orason applied to a soft non tender abdomen and tracing well. Dr Beck Larger to see.

## 2022-12-20 ENCOUNTER — ROUTINE PRENATAL (OUTPATIENT)
Dept: OBGYN CLINIC | Age: 22
End: 2022-12-20
Payer: MEDICAID

## 2022-12-20 VITALS
HEIGHT: 60 IN | SYSTOLIC BLOOD PRESSURE: 128 MMHG | BODY MASS INDEX: 34.16 KG/M2 | WEIGHT: 174 LBS | DIASTOLIC BLOOD PRESSURE: 86 MMHG

## 2022-12-20 DIAGNOSIS — O36.63X0 EXCESSIVE FETAL GROWTH AFFECTING MANAGEMENT OF PREGNANCY IN THIRD TRIMESTER, SINGLE OR UNSPECIFIED FETUS: ICD-10-CM

## 2022-12-20 DIAGNOSIS — Z34.03 ENCOUNTER FOR SUPERVISION OF NORMAL FIRST PREGNANCY IN THIRD TRIMESTER: Primary | ICD-10-CM

## 2022-12-20 PROCEDURE — 99213 OFFICE O/P EST LOW 20 MIN: CPT | Performed by: OBSTETRICS & GYNECOLOGY

## 2022-12-20 RX ORDER — ACYCLOVIR 800 MG/1
800 TABLET ORAL
COMMUNITY

## 2022-12-20 NOTE — PROGRESS NOTES
Annita Hall  presents for [facility] [address] [address]. . 36w4d. PL and MFM notes reviewed as applicable. Taking Prenatal Vitamins: Yes  She is noticing:  Swelling, nausea, insomnia. Had covid last week but fully recovered. Minimal sx.    GBS done    Normal pregnancy, first   Well-controlled, continue current medications and continue current treatment plan

## 2022-12-22 ENCOUNTER — HOSPITAL ENCOUNTER (OUTPATIENT)
Age: 22
Discharge: HOME | End: 2022-12-22
Attending: OBSTETRICS & GYNECOLOGY | Admitting: OBSTETRICS & GYNECOLOGY
Payer: MEDICAID

## 2022-12-22 VITALS
DIASTOLIC BLOOD PRESSURE: 73 MMHG | OXYGEN SATURATION: 97 % | WEIGHT: 174 LBS | BODY MASS INDEX: 34.16 KG/M2 | HEIGHT: 60 IN | TEMPERATURE: 98 F | SYSTOLIC BLOOD PRESSURE: 123 MMHG | HEART RATE: 86 BPM | RESPIRATION RATE: 16 BRPM

## 2022-12-22 PROCEDURE — 59025 FETAL NON-STRESS TEST: CPT

## 2022-12-22 PROCEDURE — 99282 EMERGENCY DEPT VISIT SF MDM: CPT

## 2022-12-22 NOTE — ED TRIAGE NOTES
OB ED Provider Note    Name: Adrienne Davis MRN: [id number]     YOB: 2000  Age: 23 y.o. Sex: female      Subjective:     Reason for Presentation to [facility] ED:  abdominal/pelvic cramping    History of Present Illness: Ms. Venessa Villasenor is a 23 y.o.  at 36w7d gestation with Estimated Date of Delivery: 23. Her current obstetrical history is significant for uncomplicated pregnancy. Prenatal records reviewed. Having cramping off and on for several hours. Thinks they might be contractions. She reports good fetal movement. She denies vaginal bleeding. She denies leakage of fluid. OB History    Para Term  AB Living   1   0 0 0 0   SAB IAB Ectopic Molar Multiple Live Births                    # Outcome Date GA Lbr Asohk/2nd Weight Sex Delivery Anes PTL Lv   1 Current              Past Medical History:   Diagnosis Date    Abnormal Papanicolaou smear of cervix 2017    LGSIL    HSV-2 (herpes simplex virus 2) infection      Past Surgical History:   Procedure Laterality Date    LEEP  2017    TONSILLECTOMY       Social History     Occupational History    Not on file   Tobacco Use    Smoking status: Never    Smokeless tobacco: Never   Vaping Use    Vaping Use: Former   Substance and Sexual Activity    Alcohol use: Not Currently    Drug use: No    Sexual activity: Yes     Partners: Male     Birth control/protection: I.U.D. Allergies   Allergen Reactions    Latex Rash    Hops Oil Other (See Comments)     Affects kidneys    Penicillins Rash and Hives     Prior to Admission medications    Medication Sig Start Date End Date Taking?  Authorizing Provider   acyclovir (ZOVIRAX) 800 MG tablet Take 800 mg by mouth  Patient not taking: No sig reported    Historical Provider, MD   ferrous sulfate (SLOW FE) 142 (45 Fe) MG extended release tablet Take 142 mg by mouth daily 22   Kirt Shin MD   Prenatal MV & Min w/FA-DHA (ONE A DAY PRENATAL PO) Take by mouth    Historical Provider, MD          Objective:     Physical Exam:  VITALS:  BP (!) 134/97   Pulse (!) 108   Temp 98 °F (36.7 °C) (Oral)   Resp 16   Ht 5' (1.524 m)   Wt 174 lb (78.9 kg)   LMP 04/01/2022 (Approximate)   SpO2 97%   BMI 33.98 kg/m²     CONSTITUTIONAL:  awake, alert, cooperative, no apparent distress, and appears stated age  FHTs: Reactive and reassuring and Category I strip  Uterine activity/Contractions: Uterine irritability, but no regular contractions  Membranes: intact  Cervix: fingertip (1/2 cm) dilated, thick effaced, -3 station  Relatively narrow introitus    Lab/Data Review & Summary:  GBS negative preliminarily    Assessment:  36w6d gestation  Not in labor;   Obstetrical history significant for uncomplicated pregnancy  Reassuring fetal status      Plan:  Discharge home, follow-up at next [facility] appointment

## 2022-12-22 NOTE — PROGRESS NOTES
Discharge instruction given. Information/teaching printout given to patient. States understanding. All questions answered. Informed pt to return to hospital for decreased fetal movement, if she suspects her water breaks, if vaginal bleeding occurs that is more than spotting, or she starts to experience painful regular contractions. Pt verbalizes understanding. Discussed importance of follow up with primary MD. Ambulate out to personal auto.

## 2022-12-22 NOTE — PROGRESS NOTES
OB ED     Pt to [facility] with complaints of abdominal pain. EFM and TOCO applied. Abd palpated soft. Positive fetal movement noted, denies LOF or VB. Denies recent Covid exposure or symptoms.  [facility] notified of patient's arrival.

## 2022-12-22 NOTE — PROGRESS NOTES
[facility]              Date: 12/22/22    Patient: Jose Luis Hurst   Date of Birth: [birth year]   Date of Visit: 12/22/22       To Whom It May Concern:    Patient was seen at [address] [place] [address] on 12/22/22 due to medical issues she will be unable to return to work until 12/23/22. If you have any questions or concerns, please don't hesitate to call.     Sincerely,    _____________________________    Yuliya Sarkar RN    [phone]

## 2022-12-24 LAB
BACTERIA SPEC CULT: NORMAL
SERVICE CMNT-IMP: NORMAL

## 2022-12-28 ENCOUNTER — TELEPHONE (OUTPATIENT)
Dept: OBGYN CLINIC | Age: 22
End: 2022-12-28

## 2022-12-28 ENCOUNTER — ROUTINE PRENATAL (OUTPATIENT)
Dept: OBGYN CLINIC | Age: 22
End: 2022-12-28
Payer: MEDICAID

## 2022-12-28 VITALS — DIASTOLIC BLOOD PRESSURE: 82 MMHG | SYSTOLIC BLOOD PRESSURE: 130 MMHG | WEIGHT: 174 LBS | BODY MASS INDEX: 33.98 KG/M2

## 2022-12-28 DIAGNOSIS — O36.63X0 EXCESSIVE FETAL GROWTH AFFECTING MANAGEMENT OF PREGNANCY IN THIRD TRIMESTER, SINGLE OR UNSPECIFIED FETUS: Primary | ICD-10-CM

## 2022-12-28 DIAGNOSIS — Z34.03 ENCOUNTER FOR SUPERVISION OF NORMAL FIRST PREGNANCY IN THIRD TRIMESTER: ICD-10-CM

## 2022-12-28 PROCEDURE — 76816 OB US FOLLOW-UP PER FETUS: CPT | Performed by: OBSTETRICS & GYNECOLOGY

## 2022-12-28 PROCEDURE — 99213 OFFICE O/P EST LOW 20 MIN: CPT | Performed by: OBSTETRICS & GYNECOLOGY

## 2022-12-28 NOTE — TELEPHONE ENCOUNTER
----- Message from Cece Berg MD sent at 2022 10:56 AM EST -----  [address]-  -Please schedule IOL for me on 23.  Reason =  Elective indications > 39 weeks  -Cervical ripening needed:  Yes - Cytotec 50mcg buccal q 4hrs x 3 doses on Tuesday 1/10 - needs to arrive by 5pm to start cytotec by 6pm    Thanks, Dr. Valerio Rendon

## 2022-12-28 NOTE — TELEPHONE ENCOUNTER
[facility], IOL scheduled for 1/10/23 pm, 1/11/23 am with . Pt instructions sent via [employer]oro. IOL form faxed to L&D.

## 2022-12-28 NOTE — PROGRESS NOTES
Denise Damon  presents for [address]. . 37w5d. PL and MFM notes reviewed as applicable. Taking Prenatal Vitamins: Yes  She is noticing:  no unusual complaints    Excessive fetal growth affecting management of pregnancy in third trimester   noted    Normal pregnancy, first   Well-controlled, continue current medications and continue current treatment plan    IOL 39wk.   Consider CS for slow progress due to size / maternal pelvis

## 2023-01-01 ENCOUNTER — HOSPITAL ENCOUNTER (OUTPATIENT)
Age: 23
Discharge: HOME | DRG: 540 | End: 2023-01-01
Attending: OBSTETRICS & GYNECOLOGY | Admitting: OBSTETRICS & GYNECOLOGY
Payer: MEDICAID

## 2023-01-01 ENCOUNTER — ANESTHESIA (OUTPATIENT)
Dept: OPERATING ROOM | Age: 23
DRG: 540 | End: 2023-01-01
Payer: MEDICAID

## 2023-01-01 ENCOUNTER — HOSPITAL ENCOUNTER (INPATIENT)
Age: 23
LOS: 3 days | Discharge: HOME | DRG: 540 | End: 2023-01-04
Attending: OBSTETRICS & GYNECOLOGY | Admitting: OBSTETRICS & GYNECOLOGY
Payer: MEDICAID

## 2023-01-01 ENCOUNTER — ANESTHESIA EVENT (OUTPATIENT)
Dept: OPERATING ROOM | Age: 23
DRG: 540 | End: 2023-01-01
Payer: MEDICAID

## 2023-01-01 VITALS
SYSTOLIC BLOOD PRESSURE: 110 MMHG | RESPIRATION RATE: 16 BRPM | HEART RATE: 72 BPM | TEMPERATURE: 98.2 F | DIASTOLIC BLOOD PRESSURE: 67 MMHG | OXYGEN SATURATION: 98 %

## 2023-01-01 DIAGNOSIS — Z98.891 STATUS POST CESAREAN SECTION: Primary | ICD-10-CM

## 2023-01-01 PROBLEM — Z37.9 NORMAL LABOR: Status: ACTIVE | Noted: 2023-01-01

## 2023-01-01 LAB
ABO + RH BLD: NORMAL
BASE DEFICIT BLD-SCNC: 5.1 MMOL/L
BASE DEFICIT BLD-SCNC: 5.8 MMOL/L
BASOPHILS # BLD: 0.1 K/UL (ref 0–0.2)
BASOPHILS NFR BLD: 0 % (ref 0–2)
BLOOD GROUP ANTIBODIES SERPL: NORMAL
DIFFERENTIAL METHOD BLD: ABNORMAL
EOSINOPHIL # BLD: 0.1 K/UL (ref 0–0.8)
EOSINOPHIL NFR BLD: 1 % (ref 0.5–7.8)
ERYTHROCYTE [DISTWIDTH] IN BLOOD BY AUTOMATED COUNT: 12.7 % (ref 11.9–14.6)
HCO3 BLD-SCNC: 22.9 MMOL/L (ref 22–26)
HCO3 BLDV-SCNC: 22.7 MMOL/L (ref 23–28)
HCT VFR BLD AUTO: 38.9 % (ref 35.8–46.3)
HGB BLD-MCNC: 13.3 G/DL (ref 11.7–15.4)
IMM GRANULOCYTES # BLD AUTO: 0.1 K/UL (ref 0–0.5)
IMM GRANULOCYTES NFR BLD AUTO: 1 % (ref 0–5)
LYMPHOCYTES # BLD: 2.2 K/UL (ref 0.5–4.6)
LYMPHOCYTES NFR BLD: 17 % (ref 13–44)
MCH RBC QN AUTO: 31.9 PG (ref 26.1–32.9)
MCHC RBC AUTO-ENTMCNC: 34.2 G/DL (ref 31.4–35)
MCV RBC AUTO: 93.3 FL (ref 82–102)
MONOCYTES # BLD: 0.8 K/UL (ref 0.1–1.3)
MONOCYTES NFR BLD: 6 % (ref 4–12)
NEUTS SEG # BLD: 9.4 K/UL (ref 1.7–8.2)
NEUTS SEG NFR BLD: 75 % (ref 43–78)
NRBC # BLD: 0 K/UL (ref 0–0.2)
PCO2 BLDCO: 51 MMHG (ref 32–68)
PCO2 BLDCO: 57 MMHG (ref 32–68)
PH BLDCO: 7.21 [PH] (ref 7.15–7.38)
PH BLDCO: 7.26 [PH] (ref 7.15–7.38)
PLATELET # BLD AUTO: 117 K/UL (ref 150–450)
PMV BLD AUTO: 13.2 FL (ref 9.4–12.3)
PO2 BLDCO: 10 MMHG
PO2 BLDCO: <5 MMHG
RBC # BLD AUTO: 4.17 M/UL (ref 4.05–5.2)
SAO2 % BLD: 6.8 % (ref 95–98)
SERVICE CMNT-IMP: ABNORMAL
SERVICE CMNT-IMP: ABNORMAL
SPECIMEN EXP DATE BLD: NORMAL
SPECIMEN TYPE: ABNORMAL
SPECIMEN TYPE: ABNORMAL
WBC # BLD AUTO: 12.6 K/UL (ref 4.3–11.1)

## 2023-01-01 PROCEDURE — 2580000003 HC RX 258: Performed by: OBSTETRICS & GYNECOLOGY

## 2023-01-01 PROCEDURE — 2580000003 HC RX 258: Performed by: NURSE ANESTHETIST, CERTIFIED REGISTERED

## 2023-01-01 PROCEDURE — 6360000002 HC RX W HCPCS: Performed by: OBSTETRICS & GYNECOLOGY

## 2023-01-01 PROCEDURE — 3700000025 EPIDURAL BLOCK: Performed by: ANESTHESIOLOGY

## 2023-01-01 PROCEDURE — 6370000000 HC RX 637 (ALT 250 FOR IP): Performed by: OBSTETRICS & GYNECOLOGY

## 2023-01-01 PROCEDURE — 99283 EMERGENCY DEPT VISIT LOW MDM: CPT

## 2023-01-01 PROCEDURE — 85025 COMPLETE CBC W/AUTO DIFF WBC: CPT

## 2023-01-01 PROCEDURE — 3609079900 HC CESAREAN SECTION: Performed by: OBSTETRICS & GYNECOLOGY

## 2023-01-01 PROCEDURE — 7210000100 HC LABOR FEE PER 1 HR

## 2023-01-01 PROCEDURE — 86901 BLOOD TYPING SEROLOGIC RH(D): CPT

## 2023-01-01 PROCEDURE — 10907ZC DRAINAGE OF AMNIOTIC FLUID, THERAPEUTIC FROM PRODUCTS OF CONCEPTION, VIA NATURAL OR ARTIFICIAL OPENING: ICD-10-PCS | Performed by: OBSTETRICS & GYNECOLOGY

## 2023-01-01 PROCEDURE — 4A1HXCZ MONITORING OF PRODUCTS OF CONCEPTION, CARDIAC RATE, EXTERNAL APPROACH: ICD-10-PCS | Performed by: OBSTETRICS & GYNECOLOGY

## 2023-01-01 PROCEDURE — 36415 COLL VENOUS BLD VENIPUNCTURE: CPT

## 2023-01-01 PROCEDURE — C1765 ADHESION BARRIER: HCPCS | Performed by: OBSTETRICS & GYNECOLOGY

## 2023-01-01 PROCEDURE — A4216 STERILE WATER/SALINE, 10 ML: HCPCS | Performed by: ANESTHESIOLOGY

## 2023-01-01 PROCEDURE — 7100000001 HC PACU RECOVERY - ADDTL 15 MIN: Performed by: OBSTETRICS & GYNECOLOGY

## 2023-01-01 PROCEDURE — 2500000003 HC RX 250 WO HCPCS: Performed by: ANESTHESIOLOGY

## 2023-01-01 PROCEDURE — 2580000003 HC RX 258: Performed by: ANESTHESIOLOGY

## 2023-01-01 PROCEDURE — 2500000003 HC RX 250 WO HCPCS: Performed by: NURSE ANESTHETIST, CERTIFIED REGISTERED

## 2023-01-01 PROCEDURE — 2709999900 HC NON-CHARGEABLE SUPPLY: Performed by: OBSTETRICS & GYNECOLOGY

## 2023-01-01 PROCEDURE — 82803 BLOOD GASES ANY COMBINATION: CPT

## 2023-01-01 PROCEDURE — 6360000002 HC RX W HCPCS: Performed by: NURSE ANESTHETIST, CERTIFIED REGISTERED

## 2023-01-01 PROCEDURE — 6370000000 HC RX 637 (ALT 250 FOR IP): Performed by: ANESTHESIOLOGY

## 2023-01-01 PROCEDURE — 3700000000 HC ANESTHESIA ATTENDED CARE: Performed by: OBSTETRICS & GYNECOLOGY

## 2023-01-01 PROCEDURE — 6360000002 HC RX W HCPCS: Performed by: ANESTHESIOLOGY

## 2023-01-01 PROCEDURE — 3700000001 HC ADD 15 MINUTES (ANESTHESIA): Performed by: OBSTETRICS & GYNECOLOGY

## 2023-01-01 PROCEDURE — 7100000000 HC PACU RECOVERY - FIRST 15 MIN: Performed by: OBSTETRICS & GYNECOLOGY

## 2023-01-01 PROCEDURE — 1100000000 HC RM PRIVATE

## 2023-01-01 RX ORDER — EPHEDRINE SULFATE/0.9% NACL/PF 50 MG/5 ML
SYRINGE (ML) INTRAVENOUS PRN
Status: DISCONTINUED | OUTPATIENT
Start: 2023-01-01 | End: 2023-01-01 | Stop reason: SDUPTHER

## 2023-01-01 RX ORDER — SODIUM CHLORIDE, SODIUM LACTATE, POTASSIUM CHLORIDE, AND CALCIUM CHLORIDE .6; .31; .03; .02 G/100ML; G/100ML; G/100ML; G/100ML
500 INJECTION, SOLUTION INTRAVENOUS PRN
Status: DISCONTINUED | OUTPATIENT
Start: 2023-01-01 | End: 2023-01-02

## 2023-01-01 RX ORDER — SODIUM CHLORIDE 0.9 % (FLUSH) 0.9 %
5-40 SYRINGE (ML) INJECTION EVERY 12 HOURS SCHEDULED
Status: DISCONTINUED | OUTPATIENT
Start: 2023-01-01 | End: 2023-01-02 | Stop reason: ALTCHOICE

## 2023-01-01 RX ORDER — MORPHINE SULFATE 0.5 MG/ML
INJECTION, SOLUTION EPIDURAL; INTRATHECAL; INTRAVENOUS PRN
Status: DISCONTINUED | OUTPATIENT
Start: 2023-01-01 | End: 2023-01-01 | Stop reason: SDUPTHER

## 2023-01-01 RX ORDER — SODIUM CHLORIDE 0.9 % (FLUSH) 0.9 %
5-40 SYRINGE (ML) INJECTION PRN
Status: DISCONTINUED | OUTPATIENT
Start: 2023-01-01 | End: 2023-01-02 | Stop reason: ALTCHOICE

## 2023-01-01 RX ORDER — ONDANSETRON 2 MG/ML
4 INJECTION INTRAMUSCULAR; INTRAVENOUS EVERY 6 HOURS PRN
Status: DISCONTINUED | OUTPATIENT
Start: 2023-01-01 | End: 2023-01-02

## 2023-01-01 RX ORDER — NALBUPHINE HCL 10 MG/ML
2.5 AMPUL (ML) INJECTION EVERY 4 HOURS PRN
Status: DISCONTINUED | OUTPATIENT
Start: 2023-01-01 | End: 2023-01-02 | Stop reason: ALTCHOICE

## 2023-01-01 RX ORDER — SODIUM CHLORIDE 9 MG/ML
25 INJECTION, SOLUTION INTRAVENOUS PRN
Status: DISCONTINUED | OUTPATIENT
Start: 2023-01-01 | End: 2023-01-02 | Stop reason: ALTCHOICE

## 2023-01-01 RX ORDER — ACETAMINOPHEN 500 MG
1000 TABLET ORAL 3 TIMES DAILY
Status: DISCONTINUED | OUTPATIENT
Start: 2023-01-01 | End: 2023-01-02 | Stop reason: ALTCHOICE

## 2023-01-01 RX ORDER — KETOROLAC TROMETHAMINE 30 MG/ML
30 INJECTION, SOLUTION INTRAMUSCULAR; INTRAVENOUS EVERY 6 HOURS
Status: DISCONTINUED | OUTPATIENT
Start: 2023-01-01 | End: 2023-01-02 | Stop reason: ALTCHOICE

## 2023-01-01 RX ORDER — ONDANSETRON 2 MG/ML
4 INJECTION INTRAMUSCULAR; INTRAVENOUS EVERY 6 HOURS PRN
Status: DISCONTINUED | OUTPATIENT
Start: 2023-01-01 | End: 2023-01-02 | Stop reason: ALTCHOICE

## 2023-01-01 RX ORDER — SODIUM CHLORIDE, SODIUM LACTATE, POTASSIUM CHLORIDE, CALCIUM CHLORIDE 600; 310; 30; 20 MG/100ML; MG/100ML; MG/100ML; MG/100ML
INJECTION, SOLUTION INTRAVENOUS CONTINUOUS
Status: DISCONTINUED | OUTPATIENT
Start: 2023-01-01 | End: 2023-01-02 | Stop reason: ALTCHOICE

## 2023-01-01 RX ORDER — SODIUM CHLORIDE, SODIUM LACTATE, POTASSIUM CHLORIDE, AND CALCIUM CHLORIDE .6; .31; .03; .02 G/100ML; G/100ML; G/100ML; G/100ML
1000 INJECTION, SOLUTION INTRAVENOUS PRN
Status: DISCONTINUED | OUTPATIENT
Start: 2023-01-01 | End: 2023-01-02

## 2023-01-01 RX ORDER — SODIUM CHLORIDE, SODIUM LACTATE, POTASSIUM CHLORIDE, CALCIUM CHLORIDE 600; 310; 30; 20 MG/100ML; MG/100ML; MG/100ML; MG/100ML
INJECTION, SOLUTION INTRAVENOUS CONTINUOUS PRN
Status: DISCONTINUED | OUTPATIENT
Start: 2023-01-01 | End: 2023-01-01 | Stop reason: SDUPTHER

## 2023-01-01 RX ORDER — SODIUM CHLORIDE, SODIUM LACTATE, POTASSIUM CHLORIDE, CALCIUM CHLORIDE 600; 310; 30; 20 MG/100ML; MG/100ML; MG/100ML; MG/100ML
INJECTION, SOLUTION INTRAVENOUS CONTINUOUS
Status: DISCONTINUED | OUTPATIENT
Start: 2023-01-01 | End: 2023-01-02

## 2023-01-01 RX ORDER — HYDROMORPHONE HYDROCHLORIDE 1 MG/ML
1 INJECTION, SOLUTION INTRAMUSCULAR; INTRAVENOUS; SUBCUTANEOUS
Status: ACTIVE | OUTPATIENT
Start: 2023-01-01 | End: 2023-01-02

## 2023-01-01 RX ORDER — ACETAMINOPHEN 325 MG/1
650 TABLET ORAL EVERY 4 HOURS PRN
Status: DISCONTINUED | OUTPATIENT
Start: 2023-01-01 | End: 2023-01-02

## 2023-01-01 RX ORDER — OXYCODONE HYDROCHLORIDE 5 MG/1
5 TABLET ORAL EVERY 4 HOURS PRN
Status: DISCONTINUED | OUTPATIENT
Start: 2023-01-01 | End: 2023-01-02 | Stop reason: ALTCHOICE

## 2023-01-01 RX ORDER — FENTANYL CITRATE 50 UG/ML
INJECTION, SOLUTION INTRAMUSCULAR; INTRAVENOUS PRN
Status: DISCONTINUED | OUTPATIENT
Start: 2023-01-01 | End: 2023-01-01 | Stop reason: SDUPTHER

## 2023-01-01 RX ORDER — ROPIVACAINE HYDROCHLORIDE 2 MG/ML
INJECTION, SOLUTION EPIDURAL; INFILTRATION; PERINEURAL CONTINUOUS PRN
Status: DISCONTINUED | OUTPATIENT
Start: 2023-01-01 | End: 2023-01-01 | Stop reason: SDUPTHER

## 2023-01-01 RX ORDER — NALOXONE HYDROCHLORIDE 0.4 MG/ML
INJECTION, SOLUTION INTRAMUSCULAR; INTRAVENOUS; SUBCUTANEOUS PRN
Status: DISCONTINUED | OUTPATIENT
Start: 2023-01-01 | End: 2023-01-02 | Stop reason: ALTCHOICE

## 2023-01-01 RX ORDER — DOCUSATE SODIUM 100 MG/1
100 CAPSULE, LIQUID FILLED ORAL 2 TIMES DAILY
Status: DISCONTINUED | OUTPATIENT
Start: 2023-01-01 | End: 2023-01-02 | Stop reason: ALTCHOICE

## 2023-01-01 RX ORDER — LIDOCAINE HYDROCHLORIDE AND EPINEPHRINE 20; 5 MG/ML; UG/ML
INJECTION, SOLUTION EPIDURAL; INFILTRATION; INTRACAUDAL; PERINEURAL PRN
Status: DISCONTINUED | OUTPATIENT
Start: 2023-01-01 | End: 2023-01-01 | Stop reason: SDUPTHER

## 2023-01-01 RX ADMIN — Medication 10 MG: at 18:42

## 2023-01-01 RX ADMIN — LIDOCAINE HYDROCHLORIDE AND EPINEPHRINE 5 ML: 20; 5 INJECTION, SOLUTION EPIDURAL; INFILTRATION; INTRACAUDAL; PERINEURAL at 18:38

## 2023-01-01 RX ADMIN — NALBUPHINE HYDROCHLORIDE 2.5 MG: 10 INJECTION, SOLUTION INTRAMUSCULAR; INTRAVENOUS; SUBCUTANEOUS at 23:47

## 2023-01-01 RX ADMIN — MORPHINE SULFATE 2 MG: 0.5 INJECTION, SOLUTION EPIDURAL; INTRATHECAL; INTRAVENOUS at 18:38

## 2023-01-01 RX ADMIN — ACETAMINOPHEN 1000 MG: 500 TABLET, FILM COATED ORAL at 23:18

## 2023-01-01 RX ADMIN — SODIUM CHLORIDE, SODIUM LACTATE, POTASSIUM CHLORIDE, AND CALCIUM CHLORIDE: 600; 310; 30; 20 INJECTION, SOLUTION INTRAVENOUS at 22:01

## 2023-01-01 RX ADMIN — ROPIVACAINE HYDROCHLORIDE 4 ML: 2 INJECTION, SOLUTION EPIDURAL; INFILTRATION; PERINEURAL at 13:08

## 2023-01-01 RX ADMIN — AZITHROMYCIN DIHYDRATE 500 MG: 500 INJECTION, POWDER, LYOPHILIZED, FOR SOLUTION INTRAVENOUS at 18:07

## 2023-01-01 RX ADMIN — Medication 500 ML/HR: at 18:38

## 2023-01-01 RX ADMIN — SODIUM CHLORIDE, POTASSIUM CHLORIDE, SODIUM LACTATE AND CALCIUM CHLORIDE: 600; 310; 30; 20 INJECTION, SOLUTION INTRAVENOUS at 13:44

## 2023-01-01 RX ADMIN — SODIUM CHLORIDE, POTASSIUM CHLORIDE, SODIUM LACTATE AND CALCIUM CHLORIDE 500 ML: 600; 310; 30; 20 INJECTION, SOLUTION INTRAVENOUS at 13:00

## 2023-01-01 RX ADMIN — LIDOCAINE HYDROCHLORIDE AND EPINEPHRINE 5 ML: 20; 5 INJECTION, SOLUTION EPIDURAL; INFILTRATION; INTRACAUDAL; PERINEURAL at 18:35

## 2023-01-01 RX ADMIN — ROPIVACAINE HYDROCHLORIDE 8 ML/HR: 2 INJECTION, SOLUTION EPIDURAL; INFILTRATION; PERINEURAL at 13:09

## 2023-01-01 RX ADMIN — SODIUM CHLORIDE, SODIUM LACTATE, POTASSIUM CHLORIDE, AND CALCIUM CHLORIDE: 600; 310; 30; 20 INJECTION, SOLUTION INTRAVENOUS at 18:20

## 2023-01-01 RX ADMIN — ONDANSETRON 4 MG: 2 INJECTION INTRAMUSCULAR; INTRAVENOUS at 18:13

## 2023-01-01 RX ADMIN — ONDANSETRON 4 MG: 2 INJECTION INTRAMUSCULAR; INTRAVENOUS at 23:46

## 2023-01-01 RX ADMIN — MORPHINE SULFATE 1 MG: 0.5 INJECTION, SOLUTION EPIDURAL; INTRATHECAL; INTRAVENOUS at 19:07

## 2023-01-01 RX ADMIN — FENTANYL CITRATE 50 MCG: 50 INJECTION, SOLUTION INTRAMUSCULAR; INTRAVENOUS at 18:56

## 2023-01-01 RX ADMIN — ROPIVACAINE HYDROCHLORIDE 4 ML: 2 INJECTION, SOLUTION EPIDURAL; INFILTRATION; PERINEURAL at 13:06

## 2023-01-01 RX ADMIN — CEFAZOLIN 2000 MG: 10 INJECTION, POWDER, FOR SOLUTION INTRAVENOUS at 18:06

## 2023-01-01 RX ADMIN — LIDOCAINE HYDROCHLORIDE AND EPINEPHRINE 5 ML: 20; 5 INJECTION, SOLUTION EPIDURAL; INFILTRATION; INTRACAUDAL; PERINEURAL at 18:54

## 2023-01-01 RX ADMIN — DOCUSATE SODIUM 100 MG: 100 CAPSULE ORAL at 23:18

## 2023-01-01 RX ADMIN — SODIUM CHLORIDE, SODIUM LACTATE, POTASSIUM CHLORIDE, AND CALCIUM CHLORIDE: 600; 310; 30; 20 INJECTION, SOLUTION INTRAVENOUS at 18:41

## 2023-01-01 RX ADMIN — MORPHINE SULFATE 2 MG: 0.5 INJECTION, SOLUTION EPIDURAL; INTRATHECAL; INTRAVENOUS at 18:29

## 2023-01-01 RX ADMIN — Medication 10 MG: at 18:58

## 2023-01-01 RX ADMIN — FAMOTIDINE 20 MG: 10 INJECTION, SOLUTION INTRAVENOUS at 18:14

## 2023-01-01 RX ADMIN — FENTANYL CITRATE 50 MCG: 50 INJECTION, SOLUTION INTRAMUSCULAR; INTRAVENOUS at 18:46

## 2023-01-01 RX ADMIN — Medication 2 MILLI-UNITS/MIN: at 13:25

## 2023-01-01 NOTE — DISCHARGE INSTRUCTIONS
PLEASE FOLLOW-UP WITH PRIMARY OB AT NEXT SCHEDULED VISIT. RETURN TO A [facility] IF EXPERIENCING CONTRACTIONS THAT BECOME STRONGER AND CLOSER TOGETHER, VAGINAL BLEEDING, LEAKING OF FLUIDS, OR DECREASED FETAL MOVEMENT. Pregnancy Precautions: Care Instructions  Your Care Instructions     There is no sure way to prevent labor before your due date ( labor) or to prevent most other pregnancy problems. But there are things you can do to increase your chances of a healthy pregnancy. Go to your appointments, follow your doctor's advice, and take good care of yourself. Eat well, and exercise (if your doctor agrees). And make sure to drink plenty of water. Follow-up care is a key part of your treatment and safety. Be sure to make and go to all appointments, and call your doctor if you are having problems. It's also a good idea to know your test results and keep a list of the medicines you take. How can you care for yourself at home? Make sure you go to your prenatal appointments. At each visit, your doctor will check your blood pressure. Your doctor will also check to see if you have protein in your urine. High blood pressure and protein in urine are signs of preeclampsia. This condition can be dangerous for you and your baby. Drink plenty of fluids. Dehydration can cause contractions. If you have kidney, heart, or liver disease and have to limit fluids, talk with your doctor before you increase the amount of fluids you drink. Tell your doctor right away if you notice any symptoms of an infection, such as:  Burning when you urinate. A foul-smelling discharge from your vagina. Vaginal itching. Unexplained fever. Unusual pain or soreness in your uterus or lower belly. Eat a balanced diet. Include plenty of foods that are high in calcium and iron. Foods high in calcium include milk, cheese, yogurt, almonds, and broccoli.   Foods high in iron include red meat, shellfish, poultry, eggs, beans, raisins, whole-grain bread, and leafy green vegetables. Do not smoke. If you need help quitting, talk to your doctor about stop-smoking programs and medicines. These can increase your chances of quitting for good. Do not drink alcohol or use marijuana or illegal drugs. Follow your doctor's directions about activity. Your doctor will let you know how much, if any, exercise you can do. Ask your doctor if you can have sex. If you are at risk for early labor, your doctor may ask you to not have sex. Take care to prevent falls. During pregnancy, your joints are loose, and your balance is off. Sports such as bicycling, skiing, or in-line skating can increase your risk of falling. And don't ride horses or motorcycles, dive, water ski, scuba dive, or parachute jump while you are pregnant. Avoid things that can make your body too hot and may be harmful to your baby, such as a hot tub or sauna. Or talk with your doctor before doing anything that raises your body temperature. Your doctor can tell you if it's safe. Do not take any over-the-counter or herbal medicines or supplements without talking to your doctor or pharmacist first.  When should you call for help? Call 911  anytime you think you may need emergency care. For example, call if:    You passed out (lost consciousness). You have a seizure. You have severe vaginal bleeding. You have severe pain in your belly or pelvis. You have had fluid gushing or leaking from your vagina and you know or think the umbilical cord is bulging into your vagina. If this happens, immediately get down on your knees so your rear end (buttocks) is higher than your head. This will decrease the pressure on the cord until help arrives. Call your doctor now or seek immediate medical care if:    You have signs of preeclampsia, such as:  Sudden swelling of your face, hands, or feet. New vision problems (such as dimness, blurring, or seeing spots). A severe headache.      You have any vaginal bleeding. You have belly pain or cramping. You have a fever. You have had regular contractions (with or without pain) for an hour. This means that you have 8 or more within 1 hour or 4 or more in 20 minutes after you change your position and drink fluids. You have a sudden release of fluid from your vagina. You have low back pain or pelvic pressure that does not go away. You notice that your baby has stopped moving or is moving much less than normal.   Watch closely for changes in your health, and be sure to contact your doctor if you have any problems. Where can you learn more? Go to http://www.woods.com/ and enter Y951 to learn more about \"Pregnancy Precautions: Care Instructions. \"  Current as of: February 23, 2022               Content Version: 13.5  © 8154-8631 [employer]. Care instructions adapted under license by [facility] ([facility]). If you have questions about a medical condition or this instruction, always ask your healthcare professional. [place] [address] any warranty or liability for your use of this information. Counting Your Baby's Kicks: Care Instructions  Overview     Counting your baby's kicks is one way your doctor can tell that your baby is healthy. Most women--especially in a first pregnancy--feel their baby move for the first time between 16 and 22 weeks. The movement may feel like flutters rather than kicks. Your baby may move more at certain times of the day. When you are active, you may notice less kicking than when you are resting. At your prenatal visits, your doctor will ask whether the baby is active. In your last trimester, your doctor may ask you to count the number of times you feel your baby move. Follow-up care is a key part of your treatment and safety. Be sure to make and go to all appointments, and call your doctor if you are having problems.  It's also a good idea to know your test results and keep a list of the medicines you take. How do you count fetal kicks? A common method of checking your baby's movement is to note the length of time it takes to count ten movements (such as kicks, flutters, or rolls). Pick your baby's most active time of day to count. This may be any time from morning to evening. If you don't feel 10 movements in an hour, have something to eat or drink and count for another hour. If you don't feel at least 10 movements in the 2-hour period, call your doctor. When should you call for help? Call your doctor now or seek immediate medical care if:    You noticed that your baby has stopped moving or is moving much less than normal.   Watch closely for changes in your health, and be sure to contact your doctor if you have any problems. Where can you learn more? Go to http://www.woods.com/ and enter U048 to learn more about \"Counting Your Baby's Kicks: Care Instructions. \"  Current as of: February 23, 2022               Content Version: 13.5  © 5794-5355 [employer]. Care instructions adapted under license by [facility] ([facility]). If you have questions about a medical condition or this instruction, always ask your healthcare professional. [address] any warranty or liability for your use of this information.

## 2023-01-01 NOTE — H&P
History & Physical    Name: Kostas Nuñez MRN: [id number]  SSN: [id number]    YOB: 2000  Age: 23 y.o. Sex: female        Subjective: Ctxs  HPI: 22 yo G1 at 38+2wks presents with c/o ctxs. Denies lof or vb. Reported normal FM. Pregnancy has been uncomplicated. GBS is negative. Estimated Date of Delivery: 23  OB History    Para Term  AB Living   1   0 0 0 0   SAB IAB Ectopic Molar Multiple Live Births                    # Outcome Date GA Lbr Ashok/2nd Weight Sex Delivery Anes PTL Lv   1 Current                Past Medical History:   Diagnosis Date    Abnormal Papanicolaou smear of cervix 2017    LGSIL    HSV-2 (herpes simplex virus 2) infection      Past Surgical History:   Procedure Laterality Date    LEEP  2017    TONSILLECTOMY       Social History     Occupational History    Not on file   Tobacco Use    Smoking status: Never    Smokeless tobacco: Never   Vaping Use    Vaping Use: Former   Substance and Sexual Activity    Alcohol use: Not Currently    Drug use: No    Sexual activity: Yes     Partners: Male     Birth control/protection: I.U.D. Family History   Problem Relation Age of Onset    Hypertension Maternal Grandmother     Colon Cancer Neg Hx     Breast Cancer Neg Hx     Ovarian Cancer Neg Hx     Diabetes Neg Hx        Allergies   Allergen Reactions    Latex Rash    Hops Oil Other (See Comments)     Affects kidneys    Penicillins Rash and Hives     Prior to Admission medications    Medication Sig Start Date End Date Taking? Authorizing Provider   acyclovir (ZOVIRAX) 800 MG tablet Take 800 mg by mouth    Historical Provider, MD   ferrous sulfate (SLOW FE) 142 (45 Fe) MG extended release tablet Take 142 mg by mouth daily 22   James Lord MD   Prenatal MV & Min w/FA-DHA (ONE A DAY PRENATAL PO) Take by mouth    Historical Provider, MD        Review of Systems: Pertinent items are noted in HPI.  10 point ROS is otherwise positive for general discomforts in late pregnancy and early labor. Objective:     Vitals:  Vitals:    01/01/23 0232   BP: 110/67   Pulse: 72   Resp: 16   Temp: 97.6 °F (36.4 °C)   TempSrc: Axillary   SpO2: 98%        Physical Exam:   Patient without distress  Abdomen: soft, nontender  Fundus: soft and non tender  Perineum: blood absent, amniotic fluidabsent  Cervical Exam: 1 cm dilated    80% effaced    -2 station    Lower Extremities:  - Edema 1+  Membranes:  Intact  Fetal Heart Rate: Baseline: 120s per minute  Variability: moderate  Accelerations: yes  Decelerations: none  Uterine contractions: irregular, every 2-6 minutes    Prenatal Labs:   Lab Results   Component Value Date/Time    ABORH O POSITIVE 06/08/2022 11:46 AM         Assessment/Plan: 24 yo G1 at 38+2wks presents c/o ctxs. I reviewed and interpreted the NST as a category 1 tracing. Active Problems:    * No active hospital problems. *  Resolved Problems:    * No resolved hospital problems. *       Plan:   Cervix remained 1cm on repeat exam.   Pt was discharged to home. Labor precautions were reviewed.

## 2023-01-01 NOTE — PROGRESS NOTES
Pt presents to [facility] with c/o ctx that started at 2000 yesterday evening and have progressively gotten worse through the night. Pt denies VB, LOF, and states +FM.

## 2023-01-01 NOTE — PROGRESS NOTES
[address] [facility]  [address] [facility], [address], [address]  [address] [facility], [place], Dolores Carrasco, [facility]    Labor Progress Note    Pt tolerating labor well.     Vitals:    01/01/23 1128   BP: 125/80   Pulse: 72   Resp: 20   Temp: 98.2 °F (36.8 °C)   TempSrc: Axillary   SpO2: 99%       FHT's:  Baseline -140's, reactive  Mullins:  irreg ctx    SVE:  5/100/0  Membranes:  AROM - clear    Assessement and Plan: Valeria Dates 23 y.o. Ryan Dominguez at 38w2d admitted for labor    Will begin pitocin augmentation     Pain control: none    GBS: homar Amador MD  12:38 PM  01/01/23

## 2023-01-01 NOTE — ANESTHESIA PROCEDURE NOTES
Epidural Block    Patient location during procedure: OB  Start time: 1/1/2023 12:56 PM  End time: 1/1/2023 1:01 PM  Reason for block: labor epidural  Staffing  Performed: anesthesiologist   Anesthesiologist: Muna Michaels MD  Epidural  Patient position: sitting  Prep: ChloraPrep  Patient monitoring: continuous pulse ox and frequent blood pressure checks  Approach: midline  Location: L3-4  Injection technique: JOSSELYN saline  Provider prep: mask and sterile gloves  Needle  Needle type: Tuohy   Needle gauge: 17 G  Needle length: 3.5 in (10 cm)  Needle insertion depth: 7 cm  Catheter type: end hole  Catheter size: 19 G  Catheter at skin depth: 11 cm  Test dose: negativeCatheter Secured: tegaderm and tape (liquid adhesive)  Assessment  Hemodynamics: stable  Attempts: 1  Outcomes: patient tolerated procedure well and uncomplicated  Additional Notes  3 cc 1% lidocaine local at needle insertion site. Discussed the risks and benefits of epidural placement and use with patient including (but not limited to) the risk of wet tap and spinal headache, inadequate analgesia, need for removal/replacement of epidural, and hypotension. I discussed the remote risk of uncontrolled bleeding or infection requiring an operation to remedy. After the patient agreed to proceed, I ensured the patient had no contraindications to labor epidural placement including prohibitive heart defects, hypocoagulation, family or personal history of a bleeding disorder. Epidural placement is described in the block note. Frequent hemodynamic monitoring in the first 30 minutes following initial placement was performed. The patient and OB RN were instructed to call anytime with any questions or concerns at anytime.      Preanesthetic Checklist  Completed: patient identified, IV checked, risks and benefits discussed, equipment checked, pre-op evaluation, timeout performed, anesthesia consent given, oxygen available and monitors applied/VS acknowledged

## 2023-01-01 NOTE — H&P
History & Physical    Name: Pamela Marinelli MRN: [id number]  SSN: [id number]    YOB: 2000  Age: 23 y.o. Sex: female      Subjective:     Reason for Triage visit:  38w2d and contractions    History of Present Illness: Ms. Mic Pickering is a 23 y.o.  female with an estimated gestational age of 36w4d with Estimated Date of Delivery: 23. Patient states that she left at 6 am and was 1 cm. Increasing contractions and intensity and now 3 cm with BBOW. She felt ruptured her bag of water but amnisure was negative and bag of water felt on exam.  Patient denies chest pain, fever, headache , nausea and vomiting, pelvic pressure, right upper quadrant pain  , shortness of breath, swelling, vaginal bleeding , and visual disturbances. OB History    Para Term  AB Living   1   0 0 0 0   SAB IAB Ectopic Molar Multiple Live Births                    # Outcome Date GA Lbr Ashok/2nd Weight Sex Delivery Anes PTL Lv   1 Current              Past Medical History:   Diagnosis Date    Abnormal Papanicolaou smear of cervix 2017    LGSIL    HSV-2 (herpes simplex virus 2) infection      Past Surgical History:   Procedure Laterality Date    LEEP  2017    TONSILLECTOMY       Social History     Occupational History    Not on file   Tobacco Use    Smoking status: Never    Smokeless tobacco: Never   Vaping Use    Vaping Use: Former   Substance and Sexual Activity    Alcohol use: Not Currently    Drug use: No    Sexual activity: Yes     Partners: Male     Birth control/protection: I.U.D. Family History   Problem Relation Age of Onset    Hypertension Maternal Grandmother     Colon Cancer Neg Hx     Breast Cancer Neg Hx     Ovarian Cancer Neg Hx     Diabetes Neg Hx        Allergies   Allergen Reactions    Latex Rash    Hops Oil Other (See Comments)     Affects kidneys    Penicillins Rash and Hives     Prior to Admission medications    Medication Sig Start Date End Date Taking?  Authorizing Provider   acyclovir (ZOVIRAX) 800 MG tablet Take 800 mg by mouth    Historical Provider, MD   ferrous sulfate (SLOW FE) 142 (45 Fe) MG extended release tablet Take 142 mg by mouth daily 22   Lori Armijo MD   Prenatal MV & Min w/FA-DHA (ONE A DAY PRENATAL PO) Take by mouth    Historical Provider, MD        Review of Systems:  Complete review of systems performed. Those not specifically mentioned in the HPI are either negative are non related to this patient encounter. Objective:     Vitals:    Vitals:    23 1128   BP: 125/80   Pulse: 72   Resp: 20   Temp: 98.2 °F (36.8 °C)   TempSrc: Axillary   SpO2: 99%      Temp (24hrs), Av.2 °F (36.8 °C), Min:98.2 °F (36.8 °C), Max:98.2 °F (36.8 °C)    BP  Min: 110/67  Max: 125/80       Physical Exam:  Heart: RRR  Lungs: cta bl  Adb: soft, nt nd, gravid  Ext: no edema noted  CVX: 3/90/bbow  Membranes:  Intact  Uterine Activity:  Frequency: Every 4 minutes   Fetal Heart Rate:  Reactive       Lab/Data Review:  No results found for this or any previous visit (from the past 24 hour(s)). Assessment and Plan:   38w2d with active labor, no evidence of ROM. GBS (-)  Will admit and AROM per Dr. Marshall Rowan.

## 2023-01-01 NOTE — PROGRESS NOTES
01/01/23 1445 01/01/23 1450   Cervical Exam   Dilation (cm) 8 9   Effacement 100 100   Station -1 -1   Station (Labor Curve Graph) 6 6   OB Examiner OMAR RN OMAR RN   Vaginal Exam Comments to 9  --      Gutierrez placed to bedside bag. FHR with prolonged variable decelerations. SVE significant change. Left side right side and O2 applied short term. FHR now reassuring.

## 2023-01-01 NOTE — PROGRESS NOTES
[facility]  [address], [address], [address]  [address] [facility], [place], [place] [facility]    Labor Progress Note    Pt tolerating labor well. Vitals:    01/01/23 1546 01/01/23 1556 01/01/23 1600 01/01/23 1615   BP: 124/88 110/68 109/65 113/68   Pulse: 95 64 67 77   Resp:   16    Temp:       TempSrc:       SpO2:           FHT's:  Baseline 's, reactive, intermittent variable with pushing, good RTB/BTB  [place]:  ctx q 2-4 min    SVE:  C/C/-2 (after 45 min of pushing, caput at 0 station)  Manual rotation attempted, minimal movement appreciated with pushing    Assessement and Plan: Hattie Perry 23 y.o. Kendal Sesay at 38w2d admitted for labor    Continue pitocin augmentation     Pain control: epidural    GBS: neg    Lengthy conversation with pt about pushing, descent, maternal exhaustion, etc.  Will cont pushing at this point and reassess at later time.       Yahir Henderson MD  5:34 PM  01/01/23

## 2023-01-01 NOTE — PROGRESS NOTES
States she feels like she has to have a BM and more fluid leaking out. EFM and toco in place. Amni sure in progress.

## 2023-01-01 NOTE — ANESTHESIA PRE PROCEDURE
Department of Anesthesiology  Preprocedure Note       Name:  Jeanette Alonzo   Age:  23 y. o.  :  2000                                          MRN:  [id number]         Date:  2023      Surgeon: * No surgeons listed *    Procedure: * No procedures listed *    Medications prior to admission:   Prior to Admission medications    Medication Sig Start Date End Date Taking?  Authorizing Provider   acyclovir (ZOVIRAX) 800 MG tablet Take 800 mg by mouth    Historical Provider, MD   ferrous sulfate (SLOW FE) 142 (45 Fe) MG extended release tablet Take 142 mg by mouth daily  Patient not taking: Reported on 2023   Bruno Vanegas MD   Prenatal MV & Min w/FA-DHA (ONE A DAY PRENATAL PO) Take by mouth    Historical Provider, MD       Current medications:    Current Facility-Administered Medications   Medication Dose Route Frequency Provider Last Rate Last Admin    lactated ringers infusion   IntraVENous Continuous Lionel Cruz  mL/hr at 23 1344 New Bag at 23 1344    lactated ringers bolus  500 mL IntraVENous PRN Lionel Cruz .8 mL/hr at 23 1300 500 mL at 23 1300    Or    lactated ringers bolus  1,000 mL IntraVENous PRN Lionel Cruz MD        sodium chloride flush 0.9 % injection 5-40 mL  5-40 mL IntraVENous 2 times per day Lionel Cruz MD        sodium chloride flush 0.9 % injection 5-40 mL  5-40 mL IntraVENous PRN Lionel Cruz MD        0.9 % sodium chloride infusion  25 mL IntraVENous PRN Lionel Cruz MD        ondansetron [facility]) injection 4 mg  4 mg IntraVENous Q6H PRN Lionel Cruz MD        oxytocin (PITOCIN) 30 units in 500 mL infusion  87.3 elizabeth-units/min IntraVENous Continuous PRN Lionel Cruz MD        And    oxytocin (PITOCIN) 10 unit bolus from the bag  10 Units IntraVENous PRN Lionel Cruz MD        acetaminophen (TYLENOL) tablet 650 mg  650 mg Oral Q4H PRN Lionel Cruz MD        benzocaine-menthol (DERMOPLAST) 20-0.5 % spray   Topical PRN Lionel Cruz MD        docusate sodium (COLACE) capsule 100 mg  100 mg Oral BID Lionel Cruz MD        oxytocin (PITOCIN) 30 units in 500 mL infusion  1-20 elizabeth-units/min IntraVENous Continuous Lionel Cruz MD 2 mL/hr at 01/01/23 1325 2 elizabeth-units/min at 01/01/23 1325     Facility-Administered Medications Ordered in Other Encounters   Medication Dose Route Frequency Provider Last Rate Last Admin    ropivacaine (NAROPIN) 0.2% injection 0.2%   Epidural Continuous PRN PATRIC Ruggiero - CRNA 8 mL/hr at 01/01/23 1309 8 mL/hr at 01/01/23 1309       Allergies:     Allergies   Allergen Reactions    Latex Rash    Penicillins Rash and Hives       Problem List:    Patient Active Problem List   Diagnosis Code    Lichen sclerosus M68.9    Normal pregnancy, first Z34.00    History of vaginal bleeding Z87.42    Excessive fetal growth affecting management of pregnancy in third trimester O36.63X0    Normal labor O80, Z37.9       Past Medical History:        Diagnosis Date    Abnormal Papanicolaou smear of cervix 07/17/2017    LGSIL    HSV-2 (herpes simplex virus 2) infection        Past Surgical History:        Procedure Laterality Date    LEEP  09/2017    TONSILLECTOMY         Social History:    Social History     Tobacco Use    Smoking status: Never    Smokeless tobacco: Never   Substance Use Topics    Alcohol use: Not Currently                                Counseling given: Not Answered      Vital Signs (Current):   Vitals:    01/01/23 1336 01/01/23 1337 01/01/23 1340 01/01/23 1345   BP: 124/77 123/79 119/75 123/76   Pulse: 63 63 65 64   Resp:       Temp:       TempSrc:       SpO2:                                                  BP Readings from Last 3 Encounters:   01/01/23 123/76   01/01/23 110/67   12/28/22 130/82       NPO Status:                                                                                 BMI:   Wt Readings from Last 3 Encounters:   12/28/22 174 lb (78.9 kg)   12/22/22 174 lb (78.9 kg)   12/20/22 174 lb (78.9 kg)     There is no height or weight on file to calculate BMI.    CBC:   Lab Results   Component Value Date/Time    WBC 12.6 01/01/2023 12:23 PM    RBC 4.17 01/01/2023 12:23 PM    HGB 13.3 01/01/2023 12:23 PM    HCT 38.9 01/01/2023 12:23 PM    MCV 93.3 01/01/2023 12:23 PM    RDW 12.7 01/01/2023 12:23 PM     01/01/2023 12:23 PM       CMP: No results found for: NA, K, CL, CO2, BUN, CREATININE, GFRAA, AGRATIO, LABGLOM, GLUCOSE, GLU, PROT, CALCIUM, BILITOT, ALKPHOS, AST, ALT    POC Tests: No results for input(s): POCGLU, POCNA, POCK, POCCL, POCBUN, POCHEMO, POCHCT in the last 72 hours.    Coags: No results found for: PROTIME, INR, APTT    HCG (If Applicable): No results found for: PREGTESTUR, PREGSERUM, HCG, HCGQUANT     ABGs: No results found for: PHART, PO2ART, LPI0SXI, KPM7AXZ, BEART, C2NVCHYA     Type & Screen (If Applicable):  No results found for: LABABO, LABRH    Drug/Infectious Status (If Applicable):  Lab Results   Component Value Date/Time    HEPCAB <0.1 07/09/2020 04:43 PM       COVID-19 Screening (If Applicable): No results found for: COVID19        Anesthesia Evaluation  Patient summary reviewed  Airway: Mallampati: II          Dental: normal exam         Pulmonary:Negative Pulmonary ROS breath sounds clear to auscultation                             Cardiovascular:  Exercise tolerance: good (>4 METS),       (-) past MI, murmur and peripheral edema      Rhythm: regular  Rate: normal                    Neuro/Psych:   Negative Neuro/Psych ROS     (-) CVA           GI/Hepatic/Renal: Neg GI/Hepatic/Renal ROS           ROS comment: Obesity  .   Endo/Other: Negative Endo/Other ROS   (+) : arthritis:., .                 Abdominal:             Vascular: negative vascular ROS.         Other Findings:           Anesthesia Plan      epidural     ASA 2     (Labor epidural converted to emergent C/S for arrest of descent)  Induction:  intravenous. Anesthetic plan and risks discussed with patient.               Post-op pain plan if not by surgeon: intrathecal narcotics            Rah Schuster MD   1/1/2023

## 2023-01-01 NOTE — PROGRESS NOTES
Dr. Jose M Ross updated on SVE below. Orders to discharge home.        01/01/23 0240 01/01/23 0345   Cervical Exam   Dilation (cm)  --  1   Effacement  --  80   Cervical Characteristics  --  Anterior   Station -2 -2   Station (Labor Curve Graph) 7 7   OB Examiner  --  Cindy Bishop RN

## 2023-01-01 NOTE — PROGRESS NOTES
Pt has cont to push with no movement of fetal skull. Again, D/W pt options of cont to push and another attempt at manual rotation. Pt expressed that she is exhausted and is OK with proceeding with primary C/S for arrest of descent  D/W pt at length risks/benefits of procedure including but not limited to death, bleeding, infection and damage to other internal organs. She exhibited full understanding and wishes to proceed.     Chasity Fofana MD   6:02 PM  01/01/23

## 2023-01-01 NOTE — PROGRESS NOTES
01/01/23 1158   Cervical Exam   Dilation (cm) 3   Effacement 90   OB Examiner Dr Cesar Gongora for admission received. Will admit to room 430.

## 2023-01-02 ENCOUNTER — ANESTHESIA (OUTPATIENT)
Dept: MOTHER INFANT UNIT | Age: 23
End: 2023-01-02

## 2023-01-02 ENCOUNTER — ANESTHESIA EVENT (OUTPATIENT)
Dept: MOTHER INFANT UNIT | Age: 23
End: 2023-01-02

## 2023-01-02 LAB
BASOPHILS # BLD: 0.1 K/UL (ref 0–0.2)
BASOPHILS NFR BLD: 1 % (ref 0–2)
DIFFERENTIAL METHOD BLD: ABNORMAL
EOSINOPHIL # BLD: 0 K/UL (ref 0–0.8)
EOSINOPHIL NFR BLD: 0 % (ref 0.5–7.8)
ERYTHROCYTE [DISTWIDTH] IN BLOOD BY AUTOMATED COUNT: 12.8 % (ref 11.9–14.6)
HCT VFR BLD AUTO: 31.7 % (ref 35.8–46.3)
HGB BLD-MCNC: 10.7 G/DL (ref 11.7–15.4)
IMM GRANULOCYTES # BLD AUTO: 0.1 K/UL (ref 0–0.5)
IMM GRANULOCYTES NFR BLD AUTO: 1 % (ref 0–5)
LYMPHOCYTES # BLD: 2.2 K/UL (ref 0.5–4.6)
LYMPHOCYTES NFR BLD: 14 % (ref 13–44)
MCH RBC QN AUTO: 32.1 PG (ref 26.1–32.9)
MCHC RBC AUTO-ENTMCNC: 33.8 G/DL (ref 31.4–35)
MCV RBC AUTO: 95.2 FL (ref 82–102)
MONOCYTES # BLD: 0.9 K/UL (ref 0.1–1.3)
MONOCYTES NFR BLD: 6 % (ref 4–12)
NEUTS SEG # BLD: 12.3 K/UL (ref 1.7–8.2)
NEUTS SEG NFR BLD: 79 % (ref 43–78)
NRBC # BLD: 0 K/UL (ref 0–0.2)
PLATELET # BLD AUTO: 97 K/UL (ref 150–450)
PMV BLD AUTO: 13.4 FL (ref 9.4–12.3)
RBC # BLD AUTO: 3.33 M/UL (ref 4.05–5.2)
WBC # BLD AUTO: 15.6 K/UL (ref 4.3–11.1)

## 2023-01-02 PROCEDURE — 6370000000 HC RX 637 (ALT 250 FOR IP): Performed by: ANESTHESIOLOGY

## 2023-01-02 PROCEDURE — 6370000000 HC RX 637 (ALT 250 FOR IP): Performed by: OBSTETRICS & GYNECOLOGY

## 2023-01-02 PROCEDURE — 6360000002 HC RX W HCPCS: Performed by: ANESTHESIOLOGY

## 2023-01-02 PROCEDURE — 85025 COMPLETE CBC W/AUTO DIFF WBC: CPT

## 2023-01-02 PROCEDURE — 1100000000 HC RM PRIVATE

## 2023-01-02 PROCEDURE — 36415 COLL VENOUS BLD VENIPUNCTURE: CPT

## 2023-01-02 RX ORDER — OXYCODONE HYDROCHLORIDE 5 MG/1
5 TABLET ORAL EVERY 4 HOURS PRN
Status: DISCONTINUED | OUTPATIENT
Start: 2023-01-02 | End: 2023-01-04 | Stop reason: HOSPADM

## 2023-01-02 RX ORDER — OXYCODONE HYDROCHLORIDE 5 MG/1
10 TABLET ORAL EVERY 4 HOURS PRN
Status: DISCONTINUED | OUTPATIENT
Start: 2023-01-02 | End: 2023-01-04 | Stop reason: HOSPADM

## 2023-01-02 RX ORDER — PRENATAL VIT/IRON FUM/FOLIC AC 27MG-0.8MG
1 TABLET ORAL DAILY
Status: DISCONTINUED | OUTPATIENT
Start: 2023-01-02 | End: 2023-01-02

## 2023-01-02 RX ORDER — METHYLERGONOVINE MALEATE 0.2 MG/ML
200 INJECTION INTRAVENOUS ONCE AS NEEDED
Status: DISCONTINUED | OUTPATIENT
Start: 2023-01-02 | End: 2023-01-04 | Stop reason: HOSPADM

## 2023-01-02 RX ORDER — SODIUM CHLORIDE 0.9 % (FLUSH) 0.9 %
5-40 SYRINGE (ML) INJECTION PRN
Status: DISCONTINUED | OUTPATIENT
Start: 2023-01-02 | End: 2023-01-04 | Stop reason: HOSPADM

## 2023-01-02 RX ORDER — LANOLIN
CREAM (ML) TOPICAL
Status: DISCONTINUED | OUTPATIENT
Start: 2023-01-02 | End: 2023-01-04 | Stop reason: HOSPADM

## 2023-01-02 RX ORDER — ONDANSETRON 8 MG/1
8 TABLET, ORALLY DISINTEGRATING ORAL EVERY 8 HOURS PRN
Status: DISCONTINUED | OUTPATIENT
Start: 2023-01-02 | End: 2023-01-04 | Stop reason: HOSPADM

## 2023-01-02 RX ORDER — SIMETHICONE 80 MG
80 TABLET,CHEWABLE ORAL EVERY 6 HOURS PRN
Status: DISCONTINUED | OUTPATIENT
Start: 2023-01-02 | End: 2023-01-04 | Stop reason: HOSPADM

## 2023-01-02 RX ORDER — SODIUM CHLORIDE 0.9 % (FLUSH) 0.9 %
5-40 SYRINGE (ML) INJECTION EVERY 12 HOURS SCHEDULED
Status: DISCONTINUED | OUTPATIENT
Start: 2023-01-02 | End: 2023-01-02 | Stop reason: ALTCHOICE

## 2023-01-02 RX ORDER — SODIUM CHLORIDE 9 MG/ML
INJECTION, SOLUTION INTRAVENOUS PRN
Status: DISCONTINUED | OUTPATIENT
Start: 2023-01-02 | End: 2023-01-04 | Stop reason: HOSPADM

## 2023-01-02 RX ORDER — DIPHENHYDRAMINE HYDROCHLORIDE 50 MG/ML
25 INJECTION INTRAMUSCULAR; INTRAVENOUS EVERY 6 HOURS PRN
Status: DISCONTINUED | OUTPATIENT
Start: 2023-01-02 | End: 2023-01-04 | Stop reason: HOSPADM

## 2023-01-02 RX ORDER — HALOPERIDOL 5 MG/ML
1 INJECTION INTRAMUSCULAR EVERY 4 HOURS PRN
Status: DISCONTINUED | OUTPATIENT
Start: 2023-01-02 | End: 2023-01-02 | Stop reason: ALTCHOICE

## 2023-01-02 RX ORDER — DOCUSATE SODIUM 100 MG/1
100 CAPSULE, LIQUID FILLED ORAL 2 TIMES DAILY
Status: DISCONTINUED | OUTPATIENT
Start: 2023-01-02 | End: 2023-01-04 | Stop reason: HOSPADM

## 2023-01-02 RX ORDER — IBUPROFEN 800 MG/1
800 TABLET ORAL EVERY 8 HOURS
Status: DISCONTINUED | OUTPATIENT
Start: 2023-01-02 | End: 2023-01-04 | Stop reason: HOSPADM

## 2023-01-02 RX ORDER — PRENATAL VIT/IRON FUM/FOLIC AC 27MG-0.8MG
1 TABLET ORAL DAILY
Status: DISCONTINUED | OUTPATIENT
Start: 2023-01-03 | End: 2023-01-04 | Stop reason: HOSPADM

## 2023-01-02 RX ORDER — ACETAMINOPHEN 500 MG
1000 TABLET ORAL EVERY 8 HOURS PRN
Status: DISCONTINUED | OUTPATIENT
Start: 2023-01-02 | End: 2023-01-04 | Stop reason: HOSPADM

## 2023-01-02 RX ORDER — MORPHINE SULFATE 4 MG/ML
4 INJECTION INTRAVENOUS
Status: DISCONTINUED | OUTPATIENT
Start: 2023-01-02 | End: 2023-01-04 | Stop reason: HOSPADM

## 2023-01-02 RX ORDER — FERROUS SULFATE 325(65) MG
325 TABLET ORAL 2 TIMES DAILY WITH MEALS
Status: DISCONTINUED | OUTPATIENT
Start: 2023-01-02 | End: 2023-01-04 | Stop reason: HOSPADM

## 2023-01-02 RX ORDER — FAMOTIDINE 20 MG/1
20 TABLET, FILM COATED ORAL 2 TIMES DAILY
Status: DISCONTINUED | OUTPATIENT
Start: 2023-01-02 | End: 2023-01-04 | Stop reason: HOSPADM

## 2023-01-02 RX ADMIN — DOCUSATE SODIUM 100 MG: 100 CAPSULE ORAL at 20:22

## 2023-01-02 RX ADMIN — IBUPROFEN 800 MG: 800 TABLET, FILM COATED ORAL at 18:16

## 2023-01-02 RX ADMIN — KETOROLAC TROMETHAMINE 30 MG: 30 INJECTION, SOLUTION INTRAMUSCULAR; INTRAVENOUS at 02:08

## 2023-01-02 RX ADMIN — KETOROLAC TROMETHAMINE 30 MG: 30 INJECTION, SOLUTION INTRAMUSCULAR; INTRAVENOUS at 08:16

## 2023-01-02 RX ADMIN — ACETAMINOPHEN 1000 MG: 500 TABLET, FILM COATED ORAL at 20:22

## 2023-01-02 RX ADMIN — ACETAMINOPHEN 1000 MG: 500 TABLET, FILM COATED ORAL at 08:16

## 2023-01-02 RX ADMIN — FAMOTIDINE 20 MG: 20 TABLET, FILM COATED ORAL at 20:22

## 2023-01-02 RX ADMIN — DOCUSATE SODIUM 100 MG: 100 CAPSULE ORAL at 08:16

## 2023-01-02 ASSESSMENT — PAIN DESCRIPTION - DESCRIPTORS: DESCRIPTORS: CRAMPING

## 2023-01-02 ASSESSMENT — PAIN DESCRIPTION - LOCATION: LOCATION: INCISION

## 2023-01-02 ASSESSMENT — PAIN DESCRIPTION - ORIENTATION: ORIENTATION: LOWER

## 2023-01-02 ASSESSMENT — PAIN SCALES - GENERAL: PAINLEVEL_OUTOF10: 6

## 2023-01-02 NOTE — PROGRESS NOTES
Daily Progress note:    Pt is POD#1 S/P primary  due to arrest.  No complaints today. Normal PO pain. No GI/ issues. Lochia < menses. No F/C.     VITALS  Patient Vitals for the past 24 hrs:   Temp Pulse Resp BP SpO2   23 0744 98.2 °F (36.8 °C) 80 18 107/60 97 %   23 0418 97.9 °F (36.6 °C) 75 16 100/65 98 %   23 0050 98.4 °F (36.9 °C) 79 18 107/74 97 %   23 2145 98 °F (36.7 °C) 70 18 120/73 100 %   23 -- 79 -- 118/66 99 %   23 -- 75 -- 111/62 100 %   23 -- 80 -- 114/63 100 %   23 -- 78 -- 115/67 98 %   23 -- 80 -- 108/62 99 %   23 -- 82 -- 124/61 99 %   23 -- 82 -- 107/64 98 %   23 -- 81 -- 110/65 97 %   23 -- 80 -- (!) 112/56 97 %   23 98.4 °F (36.9 °C) 78 -- 105/67 98 %   23 -- 87 -- 111/60 97 %   23 -- 92 -- (!) 93/59 98 %   23 -- 77 -- 97/60 96 %   23 -- 96 -- 111/60 97 %   23 -- 96 -- 107/60 96 %   23 98.3 °F (36.8 °C) 98 16 107/75 96 %   23 -- 86 18 107/75 --   23 -- 80 -- 120/71 --   23 -- 89 -- 121/81 --   23 1701 -- 60 -- 110/61 --   23 1615 -- 77 -- 113/68 --   23 1600 -- 67 16 109/65 --   23 1556 -- 64 -- 110/68 --   23 1546 -- 95 -- 124/88 --   23 1531 -- 65 -- 123/71 --   23 1515 -- 81 -- 125/76 --   23 1501 -- 64 20 118/68 --   23 1447 -- 72 -- 130/79 --   23 1431 -- 62 -- 121/76 --   23 1416 -- 65 -- 119/75 --   23 1401 -- 66 -- 121/76 --   23 1345 -- 64 -- 123/76 --   23 1340 -- 65 -- 119/75 --   23 1337 -- 63 -- 123/79 --   23 1336 -- 63 -- 124/77 --   23 1332 -- 61 -- 138/80 --   23 1326 -- 65 -- 134/84 --   23 1321 -- 64 -- (!) 133/93 --   23 1318 -- 65 -- 133/67 --   23 1314 -- 69 -- 124/64 --   23 1312 -- 65 -- 125/65 -- 01/01/23 1310 -- 74 -- 128/72 --   01/01/23 1309 -- 68 -- 126/70 --   01/01/23 1308 -- 70 -- 128/71 --   01/01/23 1307 -- 74 -- 126/70 --   01/01/23 1306 -- 66 -- 132/73 --   01/01/23 1305 -- 76 -- 124/74 --   01/01/23 1304 -- 77 -- 127/75 --   01/01/23 1303 -- 76 -- -- 97 %   01/01/23 1250 -- 89 -- 124/74 --   01/01/23 1128 98.2 °F (36.8 °C) 72 20 125/80 99 %        CV - RRR  LUNGS - CTA bilaterally  ABD - soft, NABS, appropriate tenderness, incision C/D/I  EXT - tr edema bilaterally    Lab Results   Component Value Date    HGB 10.7 (L) 01/02/2023        POD #1 LTCS     Pt is breast feeding. No issues or complaints today. Stable. (+) ambulating, awaiting spontaneous void. Routine PP/PO    DVT ppx SCDs    Thrombocytopenia noted 122 > 97 this AM. Repeat CBC ordered for tomorrow.      [address] [facility],   8:48 AM  01/02/23

## 2023-01-02 NOTE — ANESTHESIA POST-OP
Anesthesiology  Post-op Note    Post-op day 1 s/p  via epidural with neuraxial opioids for post-op pain management. /65   Pulse 75   Temp 97.9 °F (36.6 °C) (Oral)   Resp 16   LMP 2022 (Approximate)   SpO2 98%   Breastfeeding Unknown   Airway patent, patient appropriately hydrated and appears euvolemic. Patient is Alert and oriented. Pain is well controlled. Pruritus is well controlled. Nausea is well controlled, vomited last @ 2am.  No complaints about back or site of injection. Motor and sensory function has returned to baseline in lower extremities. Patient is satisfied with anesthetic and reports no complications. Continue current orders, then initiate surgeon's orders for pain management 24 hours after . Follow up per surgeon.

## 2023-01-02 NOTE — OP NOTE
[address], [address], [address]  [address] [facility], [place], [address] [facility], [facility]  Baylee Fenton MD, EUGENE Joyner  2000    Preop Dx:   1. IUP @ 38 2/7 weeks gestation   2. Arrest of descent    Postop Dx: Same    Procedure: Primary LTCS    Surgeon: Jameel Greer      Anesthesia: epidural    EBL: 600 mL  IVF: 800 mL  UOP: 50 mL    Complications: None    Findings:  Viable male infant. Vtx position. APGARS 4,8. Weight:  7 lbs, 14 oz. Normal appearing uterus, tubes and ovaries. Procedure:  Pt was taken to the operating room where epidural anesthesia was found to be adequate. She was then prepped and draped in the usual sterile fashion and placed in the supine position with a leftward tilt. A Pfannenstiel skin incision was made with the scalpel and carried down to the underlying layer of fascia with the bovie. The fascia was incised in the midline and the incision extended laterally with the santiago scissors. Superior of the fascial incision was grasped with Kocher clamps and  from the rectus muscles sharply and bluntly. In a similar fashion, the inferior aspect of the fascial incision was grasped with the Kocher clamps and  from the rectus muscles sharply and bluntly. The rectus muscles were  in the midline bluntly and peritoneum entered bluntly. The peritoneal incision was extended manually. Bladder blade was inserted and lower uterine incision made with the scalpel. Incision extended manually laterally. Infants head delivered atraumatically. Nose and mouth suctioned. Cord clamped and cut. Infant handed off to the waiting NICU staff. The uterus was exteriorized and cleared of all clots and debris. Hysterotomy was closed with 0-vicryl in a running, locking fashion. A second layer of 0-vicryl was placed in a interrupted figure of 8 fashion to imbricate the incision.   The pelvis and gutters were cleared of all clots and debris, the uterus returned to the abdomen and hemostasis was assured throughout. Intercede was placed over the hysterotomy site. Fascial incision repaired with 0-PDS in a running fashion. Subcutaneous tissue was cleared of all clots and debris and hemostasis assured. Subcutaneous tissue reapproximated with 3-0 vicryl rapide in a running fashion. Skin closed with Insorb in a subcuticular fashion. Pt tolerated procedure well. Sponge, lap and needle counts correct x 3. Disposition: Pt to RR stable and infant to  nursery stable.     Pathology: cord gases      Rylee Benson MD  7:16 PM  23

## 2023-01-02 NOTE — L&D DELIVERY NOTE
Mother's Information      Labor Events     Labor?: No  Cervical Ripening:   Now               Jolayne Late Girl Shonda Vargas [[id number]]      Labor Events     Labor?: No   Steroids?: None  Cervical Ripening Date/Time:     Antibiotics Received during Labor?: No  Rupture Identifier: Sac 1   Rupture Date/Time: 23 12:35:00   Rupture Type: Intact  Fluid Color: Clear  Fluid Odor: None  Fluid Volume:  Moderate  Induction: None  Augmentation: AROM, Oxytocin       Anesthesia    Method: Epidural       Start Pushing      Labor onset date/time: 23 13:00:00 Now     Dilation complete date/time: 23 16:40:00 Now     Start pushing date/time: 2023 16:44:00   Decision date/time (emergent ): 2023 17:57:00          Delivery ([place])      Delivery Date/Time:  23 18:37:00   Delivery Type: , Low Transverse    Details:  Trial of Labor?: Yes    Categorization: Primary    Priority: Non-scheduled   Skin Incision Type: Low Transverse     Uterine Incision: Low Transverse              Presentation    Presentation: Vertex       Shoulder Dystocia    Shoulder Dystocia Present?: No  Add Second Maneuver  Add Third Maneuver  Add Fourth Maneuver  Add Fifth Maneuver  Add Sixth Maneuver  Add Seventh Maneuver  Add Eighth Maneuver  Add Ninth Maneuver       Assisted Delivery Details    Forceps Attempted?: No  Vacuum Extractor Attempted?: No       Document Additional Attempt         Document Additional Attempt                 Cord    Vessels: 3 Vessels  Complications: None  Delayed Cord Clamping?: No  Cord Clamped Date/Time: 2023 18:38:00  Cord Blood Disposition: Lab  Gases Sent?: Yes       Placenta    Date/Time: 2023 18:38:00  Removal: Manual Removal  Appearance: Intact  Disposition: Discarded       Lacerations    Episiotomy: None  Perineal Lacerations: None  Other Lacerations: no non-perineal laceration       Vaginal Counts        Sponges Needles Instruments   Initial Counts      Final Counts      If the count is incorrect due to Intentionally Retained Foreign Object (IRFO) add the IRFO LDA in Lines/Drains. Add LDA: Link to [facility]       Blood Loss  Mother: Lionel Anderson #[id number]     Start of Mother's Information      Delivery Blood Loss  23 1300 - 23 1914      None                 End of Mother's Information  Mother: Lionel Anderson #[id number]                Delivery Providers    Delivering clinician: Raffi Joseph MD     Provider Role    Jerman Bustos MD Obstetrician    Ryann Crowley, RN Primary Nurse    Antonio Stockton, RN Primary [place] Nurse    Ganesh Dee MD Neonatologist    Benjamin Masterson MD Anesthesiologist    Luz Elena Grigsby, APRN - CRNA Nurse Anesthetist    Cecilia Carvajal Scrub Tech    Sonya Shafer Scrub Tech               Assessment    Living Status: Living     Apgar Scoring Key:    0 1 2    Skin Color: Blue or pale Acrocyanotic Completely pink    Heart Rate: Absent <100 bpm >100 bpm    Reflex Irritability: No response Grimace Cry or active withdrawal    Muscle Tone: Limp Some flexion Active motion    Respiratory Effort: Absent Weak cry; hypoventilation Good, crying                      Skin Color:   Heart Rate:   Reflex Irritability:   Muscle Tone:   Respiratory Effort:    Total:            1 Minute:    0    1    1    1    1    4        Apgar 1 total from OB History    5 Minute:    1    2    2    2    1    8        Apgar 5 total from OB History    10 Minute:  1    2    2    2    1    8                15 Minute:              20 Minute:                        Apgars Assigned By: LOZA              Resuscitation    Method: Bulb Suction, Room Air, Stimulation             [place] Measurements      Birth Weight: 3580 g Birth Length: 0.545 m     Head Circumference: 0.345 m Chest Circumference: 0.33 m              Title      Skin to Skin Initiation Date/Time:       Skin to Skin End Date/Time:

## 2023-01-02 NOTE — PLAN OF CARE
Problem: Pain  Goal: Verbalizes/displays adequate comfort level or baseline comfort level  2023 1023 by Tion Chakraborty RN  Outcome: Progressing  Flowsheets (Taken 2023 0816)  Verbalizes/displays adequate comfort level or baseline comfort level:   Encourage patient to monitor pain and request assistance   Assess pain using appropriate pain scale   Administer analgesics based on type and severity of pain and evaluate response   Implement non-pharmacological measures as appropriate and evaluate response     Problem: Vaginal Birth or  Section  Goal: Fetal and maternal status remain reassuring during the birth process  Description:  Birth OB-Pregnancy care plan goal which identifies if the fetal and maternal status remain reassuring during the birth process  Outcome: Completed     Problem: Postpartum  Goal: Experiences normal postpartum course  Description:  Postpartum OB-Pregnancy care plan goal which identifies if the mother is experiencing a normal postpartum course  Outcome: Progressing  Goal: Appropriate maternal -  bonding  Description:  Postpartum OB-Pregnancy care plan goal which identifies if the mother and  are bonding appropriately  Outcome: Progressing  Goal: Establishment of infant feeding pattern  Description:  Postpartum OB-Pregnancy care plan goal which identifies if the mother is establishing a feeding pattern with their   Outcome: Progressing  Goal: Incisions, wounds, or drain sites healing without S/S of infection  Outcome: Progressing     Problem: Infection - Adult  Goal: Absence of infection at discharge  Outcome: Progressing  Goal: Absence of infection during hospitalization  Outcome: Progressing     Problem: Safety - Adult  Goal: Free from fall injury  Outcome: Progressing     Problem: Discharge Planning  Goal: Discharge to home or other facility with appropriate resources  Outcome: Progressing

## 2023-01-02 NOTE — ANESTHESIA POSTPROCEDURE EVALUATION
Department of Anesthesiology  Postprocedure Note    Patient: Pamela Marinelli  MRN: [id number]  YOB: 2000  Date of evaluation: 2023      Procedure Summary     Date: 23 Room / Location: [facility] L&D OR  [facility] L&D    Anesthesia Start: 1249 Anesthesia Stop:     Procedures:        SECTION (Abdomen)      Labor Analgesia Diagnosis:       S/P primary low transverse       (Failure to progress)    Surgeons: Liane Hurtado MD Responsible Provider: Cathleen Mahan MD    Anesthesia Type: epidural ASA Status: 2          Anesthesia Type: No value filed.     Varghese Phase I:      Varghese Phase II:        Anesthesia Post Evaluation    Patient location during evaluation: PACU  Patient participation: complete - patient participated  Level of consciousness: awake and alert  Airway patency: patent  Nausea & Vomiting: no nausea and no vomiting  Complications: no  Cardiovascular status: hemodynamically stable  Respiratory status: acceptable, nonlabored ventilation and spontaneous ventilation  Hydration status: euvolemic  Comments: /65   Pulse 75   Temp 97.9 °F (36.6 °C) (Oral)   Resp 16   LMP 2022 (Approximate)   SpO2 98%   Breastfeeding Unknown     Multimodal analgesia pain management approach

## 2023-01-03 LAB
ERYTHROCYTE [DISTWIDTH] IN BLOOD BY AUTOMATED COUNT: 13 % (ref 11.9–14.6)
HCT VFR BLD AUTO: 30.7 % (ref 35.8–46.3)
HGB BLD-MCNC: 10.3 G/DL (ref 11.7–15.4)
MCH RBC QN AUTO: 32.3 PG (ref 26.1–32.9)
MCHC RBC AUTO-ENTMCNC: 33.6 G/DL (ref 31.4–35)
MCV RBC AUTO: 96.2 FL (ref 82–102)
NRBC # BLD: 0 K/UL (ref 0–0.2)
PLATELET # BLD AUTO: 99 K/UL (ref 150–450)
PMV BLD AUTO: 13.4 FL (ref 9.4–12.3)
RBC # BLD AUTO: 3.19 M/UL (ref 4.05–5.2)
WBC # BLD AUTO: 11.5 K/UL (ref 4.3–11.1)

## 2023-01-03 PROCEDURE — 36415 COLL VENOUS BLD VENIPUNCTURE: CPT

## 2023-01-03 PROCEDURE — 6370000000 HC RX 637 (ALT 250 FOR IP): Performed by: OBSTETRICS & GYNECOLOGY

## 2023-01-03 PROCEDURE — 85027 COMPLETE CBC AUTOMATED: CPT

## 2023-01-03 PROCEDURE — 1100000000 HC RM PRIVATE

## 2023-01-03 RX ADMIN — ACETAMINOPHEN 1000 MG: 500 TABLET, FILM COATED ORAL at 08:41

## 2023-01-03 RX ADMIN — DOCUSATE SODIUM 100 MG: 100 CAPSULE ORAL at 21:03

## 2023-01-03 RX ADMIN — OXYCODONE 5 MG: 5 TABLET ORAL at 04:18

## 2023-01-03 RX ADMIN — OXYCODONE 5 MG: 5 TABLET ORAL at 08:42

## 2023-01-03 RX ADMIN — FERROUS SULFATE TAB 325 MG (65 MG ELEMENTAL FE) 325 MG: 325 (65 FE) TAB at 08:42

## 2023-01-03 RX ADMIN — IBUPROFEN 800 MG: 800 TABLET, FILM COATED ORAL at 12:40

## 2023-01-03 RX ADMIN — ACETAMINOPHEN 1000 MG: 500 TABLET, FILM COATED ORAL at 16:46

## 2023-01-03 RX ADMIN — FERROUS SULFATE TAB 325 MG (65 MG ELEMENTAL FE) 325 MG: 325 (65 FE) TAB at 16:46

## 2023-01-03 RX ADMIN — IBUPROFEN 800 MG: 800 TABLET, FILM COATED ORAL at 02:07

## 2023-01-03 RX ADMIN — FAMOTIDINE 20 MG: 20 TABLET, FILM COATED ORAL at 21:02

## 2023-01-03 RX ADMIN — OXYCODONE 5 MG: 5 TABLET ORAL at 16:50

## 2023-01-03 RX ADMIN — PRENATAL VIT W/ FE FUMARATE-FA TAB 27-0.8 MG 1 TABLET: 27-0.8 TAB at 08:41

## 2023-01-03 RX ADMIN — SIMETHICONE 80 MG: 80 TABLET, CHEWABLE ORAL at 21:03

## 2023-01-03 RX ADMIN — DOCUSATE SODIUM 100 MG: 100 CAPSULE ORAL at 08:42

## 2023-01-03 ASSESSMENT — PAIN SCALES - GENERAL
PAINLEVEL_OUTOF10: 3
PAINLEVEL_OUTOF10: 2
PAINLEVEL_OUTOF10: 7
PAINLEVEL_OUTOF10: 5
PAINLEVEL_OUTOF10: 8

## 2023-01-03 ASSESSMENT — PAIN DESCRIPTION - LOCATION
LOCATION: ABDOMEN
LOCATION: INCISION
LOCATION: ABDOMEN;INCISION

## 2023-01-03 ASSESSMENT — PAIN DESCRIPTION - DESCRIPTORS: DESCRIPTORS: BURNING;SORE;TENDER

## 2023-01-03 ASSESSMENT — PAIN DESCRIPTION - ORIENTATION
ORIENTATION: ANTERIOR;LOWER
ORIENTATION: ANTERIOR;LOWER

## 2023-01-03 NOTE — PROGRESS NOTES
Shift assessment complete as noted. Patient request pain medicine. Given per request. Questions encouraged and answered. Encouraged to call for needs or concerns. Verbalizes understanding.

## 2023-01-04 VITALS
SYSTOLIC BLOOD PRESSURE: 107 MMHG | RESPIRATION RATE: 16 BRPM | DIASTOLIC BLOOD PRESSURE: 68 MMHG | OXYGEN SATURATION: 97 % | TEMPERATURE: 97.7 F | HEART RATE: 63 BPM

## 2023-01-04 PROBLEM — O99.119 THROMBOCYTOPENIA AFFECTING PREGNANCY (HCC): Status: ACTIVE | Noted: 2023-01-04

## 2023-01-04 PROBLEM — D69.6 THROMBOCYTOPENIA AFFECTING PREGNANCY (HCC): Status: ACTIVE | Noted: 2023-01-04

## 2023-01-04 PROCEDURE — 6370000000 HC RX 637 (ALT 250 FOR IP): Performed by: OBSTETRICS & GYNECOLOGY

## 2023-01-04 RX ORDER — IBUPROFEN 800 MG/1
800 TABLET ORAL EVERY 8 HOURS PRN
Qty: 90 TABLET | Refills: 0 | Status: SHIPPED | OUTPATIENT
Start: 2023-01-04

## 2023-01-04 RX ORDER — OXYCODONE HYDROCHLORIDE 10 MG/1
10 TABLET ORAL EVERY 12 HOURS PRN
Qty: 10 TABLET | Refills: 0 | Status: SHIPPED | OUTPATIENT
Start: 2023-01-04 | End: 2023-01-11

## 2023-01-04 RX ADMIN — PRENATAL VIT W/ FE FUMARATE-FA TAB 27-0.8 MG 1 TABLET: 27-0.8 TAB at 09:15

## 2023-01-04 RX ADMIN — DOCUSATE SODIUM 100 MG: 100 CAPSULE ORAL at 09:15

## 2023-01-04 RX ADMIN — OXYCODONE 10 MG: 5 TABLET ORAL at 00:19

## 2023-01-04 RX ADMIN — ACETAMINOPHEN 1000 MG: 500 TABLET, FILM COATED ORAL at 00:20

## 2023-01-04 RX ADMIN — OXYCODONE 10 MG: 5 TABLET ORAL at 09:16

## 2023-01-04 RX ADMIN — IBUPROFEN 800 MG: 800 TABLET, FILM COATED ORAL at 02:10

## 2023-01-04 RX ADMIN — FAMOTIDINE 20 MG: 20 TABLET, FILM COATED ORAL at 09:15

## 2023-01-04 RX ADMIN — IBUPROFEN 800 MG: 800 TABLET, FILM COATED ORAL at 09:15

## 2023-01-04 ASSESSMENT — PAIN DESCRIPTION - LOCATION
LOCATION: ABDOMEN;INCISION
LOCATION: ABDOMEN

## 2023-01-04 ASSESSMENT — PAIN SCALES - GENERAL
PAINLEVEL_OUTOF10: 10
PAINLEVEL_OUTOF10: 7
PAINLEVEL_OUTOF10: 2
PAINLEVEL_OUTOF10: 10

## 2023-01-04 ASSESSMENT — PAIN DESCRIPTION - ORIENTATION: ORIENTATION: ANTERIOR;LOWER

## 2023-01-04 ASSESSMENT — PAIN DESCRIPTION - DESCRIPTORS
DESCRIPTORS: SHOOTING
DESCRIPTORS: ACHING

## 2023-01-04 NOTE — DISCHARGE SUMMARY
Discharge Summary:     Date of Admission:  2023 11:15 AM  Date of Discharge:  2023      Patient is a 23 y.o. Mary Shells at 42w2d wks who was admitted for labor. The indication for  was arrest of descent. A Low Transverse  was performed. On post-op day #1, the patient had her catheter removed and was ambulating well in the [place]. Her post operative hemoglobin was stable. Patient had a normal post operative course. Incision stayed clean and dry, without erythema. Patient remained afebrile throughout the entire hospital stay. On day of discharge, she was discharged home in good condition with routine post  instructions. She was breast feeding the infant and plans on undecided for birth control. Pt was scheduled to follow up in two weeks for an incision check. HTN diagnosis: no     Pt has no hx of low plts until 1st pregnancy. Initially 123k, but dropped to 97k and 99k just before and after delivery. D/w pt her seeing hematology after delivery for assessment, since gestational thrombocytopenia usually stays above 110-120    Discharge Meds:       Medication List        START taking these medications      ibuprofen 800 MG tablet  Commonly known as: ADVIL;MOTRIN  Take 1 tablet by mouth every 8 hours as needed for Pain     oxyCODONE HCl 10 MG immediate release tablet  Commonly known as: OXY-IR  Take 1 tablet by mouth every 12 hours as needed for Pain for up to 7 days.  Max Daily Amount: 20 mg            CONTINUE taking these medications      acyclovir 800 MG tablet  Commonly known as: ZOVIRAX     ONE A DAY PRENATAL PO            STOP taking these medications      Slow Fe 142 (45 Fe) MG extended release tablet  Generic drug: ferrous sulfate               Where to Get Your Medications        These medications were sent to [facility]/pharmacy #[id number], [address]  [address] [facility], [address] [place] [address]      Phone: [phone] ibuprofen 800 MG tablet  oxyCODONE HCl 10 MG immediate release tablet              Katie Cosme MD, MD  10:40 AM  01/04/23

## 2023-01-04 NOTE — PROGRESS NOTES
Post-Operative Day Number 3 Progress Note  Toney Williamson  [id number]    Patient doing well post-op day 3 from  delivery without significant complaints. Pain controlled on current medication. Voiding without difficulty, normal lochia. Tolerating regular diet    Vitals:  Patient Vitals for the past 24 hrs:   BP Temp Temp src Pulse Resp SpO2   23 0740 107/68 97.7 °F (36.5 °C) Oral 63 16 97 %   23 0005 114/73 97.7 °F (36.5 °C) Oral 75 16 99 %   23 1538 129/77 98 °F (36.7 °C) Oral 83 18 98 %     Temp (24hrs), Av.8 °F (36.6 °C), Min:97.7 °F (36.5 °C), Max:98 °F (36.7 °C)      Vital signs stable, afebrile. Exam:  Patient without distress. Abdomen soft, fundus firm at level of umbilicus, nontender. Incision dry and                      clean without erythema. Labs: No results found for this or any previous visit (from the past 24 hour(s)). Assessment and Plan:  Patient appears to be having uncomplicated post- course. Continue routine post-op care and maternal education. Rubella +  Low plts- stable at 99K yday  Hgb 13.3-->10.3    Pt was not aware of her low plts. D/w her today and provided reassurance. Pt has no hx of low plts until 1st pregnancy. Initially 123k, but dropped to 97k and 99k just before and after delivery.    D/w pt her seeing hematology after delivery for assessment, since gestational thrombocytopenia usually stays above 110-120

## 2023-01-04 NOTE — PROGRESS NOTES
Progress Note                               Patient: Vertie Litten MRN: [id number]  SSN: [id number]    YOB: 2000  Age: 23 y.o. Sex: female      Postpartum/Post Op Day Number 2    Subjective:     Patient doing well without significant complaints. Ambulating, voiding without difficulty Patient reports normal lochia. Objective:     Patient Vitals for the past 18 hrs:   Temp Pulse Resp BP SpO2   23 1538 98 °F (36.7 °C) 83 18 129/77 98 %   23 0751 97.5 °F (36.4 °C) 75 18 112/77 99 %        Temp (24hrs), Av.7 °F (36.5 °C), Min:97.5 °F (36.4 °C), Max:98 °F (36.7 °C)      Physical Exam:  in  Patient without distress. Neuro / Psych grossly WNL, A&O X 3, Abdomen: Abdomen appropriately tender, Fundus Firm, without erythema, without hematoma, without evidence of infection, and incsion healing well, C/D/I, Lower extremities:   - Edema No    Lab/Data Review: All lab results for the last 24 hours reviewed. Assessment and Plan:     Patient appears to be having an uncomplicated postpartum / post op course. Continue routine post op care and maternal education.     Eli Potts MD

## 2023-01-04 NOTE — DISCHARGE INSTRUCTIONS
After Your Delivery (the Postpartum Period): Care Instructions  Overview     Congratulations on the birth of your baby. Like pregnancy, the  period can be a time of excitement, rodrick, and exhaustion. You may look at your wondrous little baby and feel happy. You may also be overwhelmed by your new sleep hours and new responsibilities. At first, babies often sleep during the days and are awake at night. They do not have a pattern or routine. They may make sudden gasps, jerk themselves awake, or look like they have crossed eyes. These are all normal, and they may even make you smile. In these first weeks after delivery, try to take good care of yourself. It may take 4 to 6 weeks to feel like yourself again, and possibly longer if you had a  birth. You will likely feel very tired for several weeks. Your days will be full of ups and downs, but lots of rodrick as well. Follow-up care is a key part of your treatment and safety. Be sure to make and go to all appointments, and call your doctor if you are having problems. It's also a good idea to know your test results and keep a list of the medicines you take. How can you care for yourself at home? Take care of your body after delivery  Use pads instead of tampons for the bloody flow that may last as long as 2 weeks. Ease cramps with ibuprofen (Advil, Motrin). Ease soreness of hemorrhoids and the area between your vagina and rectum with ice compresses or witch hazel pads. Ease constipation by drinking lots of fluid and eating high-fiber foods. Ask your doctor about over-the-counter stool softeners. Cleanse yourself with a gentle squeeze of warm water from a bottle instead of wiping with toilet paper. Take a sitz bath in warm water several times a day. Wear a good nursing bra. Ease sore and swollen breasts with warm, wet washcloths. If you aren't breastfeeding, use ice rather than heat for breast soreness.   Your period may not start for several months if you are breastfeeding. You may bleed more, and longer at first, than you did before you got pregnant. Wait until you are healed (about 4 to 6 weeks) before you have sex. Ask your doctor when it is okay for you to have sex. Try not to travel with your baby for 5 or 6 weeks. If you take a long car trip, make frequent stops to walk around and stretch. Avoid exhaustion  Rest every day. Try to nap when your baby naps. Ask another adult to be with you for a few days after delivery. Plan for  if you have other children. Stay flexible so you can eat at odd hours and sleep when you need to. Both you and your baby are making new schedules. Plan small trips to get out of the house. Change can make you feel less tired. Ask for help with housework, cooking, and shopping. Remind yourself that your job is to care for your baby. Know about help for postpartum depression  \"Baby blues\" are common for the first 1 to 2 weeks after birth. You may cry or feel sad or irritable for no reason. Rest whenever you can. Being tired makes it harder to handle your emotions. Go for walks with your baby. Talk to your partner, friends, and family about your feelings. If your symptoms last for more than a few weeks, or if you feel very depressed, ask your doctor for help. Postpartum depression can be treated. Support groups and counseling can help. Sometimes medicine can also help. Stay healthy  Eat healthy foods so you have more energy. If you breastfeed, avoid drugs. If you quit smoking during pregnancy, try to stay smoke-free. If you choose to have a drink now and then, have only one drink, and limit the number of occasions that you have a drink. Wait to breastfeed at least 2 hours after you have a drink to reduce the amount of alcohol the baby may get in the milk. Start daily exercise after 4 to 6 weeks, but rest when you feel tired. Learn exercises to tone your belly.  Try Kegel exercises to regain strength in your pelvic muscles. You can do these exercises while you stand or sit. (If doing these exercises causes pain, stop doing them and talk with your doctor.)  Squeeze your muscles as if you were trying not to pass gas. Or squeeze your muscles as if you were stopping the flow of urine. Your belly, legs, and buttocks shouldn't move. Hold the squeeze for 3 seconds, then relax for 5 to 10 seconds. Start with 3 seconds, then add 1 second each week until you are able to squeeze for 10 seconds. Repeat the exercise 10 times a session. Do 3 to 8 sessions a day. Find a class for you and your baby that has an exercise time. If you had a  birth, give yourself a bit more time before you exercise, and be careful. When should you call for help? Share this information with your partner, family, or a friend. They can help you watch for warning signs. Call 911  anytime you think you may need emergency care. For example, call if:    You have thoughts of harming yourself, your baby, or another person. You passed out (lost consciousness). You have chest pain, are short of breath, or cough up blood. You have a seizure. Call your doctor now or seek immediate medical care if:    You have signs of hemorrhage (too much bleeding), such as:  Heavy vaginal bleeding. This means that you are soaking through one or more pads in an hour. Or you pass blood clots bigger than an egg. Feeling dizzy or lightheaded, or you feel like you may faint. Feeling so tired or weak that you cannot do your usual activities. A fast or irregular heartbeat. New or worse belly pain. You have signs of infection, such as:  A fever. Vaginal discharge that smells bad. New or worse belly pain. You have symptoms of a blood clot in your leg (called a deep vein thrombosis), such as:  Pain in the calf, back of the knee, thigh, or groin. Redness and swelling in your leg or groin.      You have signs of preeclampsia, such as:  Sudden swelling of your face, hands, or feet. New vision problems (such as dimness, blurring, or seeing spots). A severe headache. Watch closely for changes in your health, and be sure to contact your doctor if:    Your vaginal bleeding isn't decreasing. You feel sad, anxious, or hopeless for more than a few days. You are having problems with your breasts or breastfeeding. Where can you learn more? Go to http://www.woods.com/ and enter A461 to learn more about \"After Your Delivery (the Postpartum Period): Care Instructions. \"  Current as of: February 23, 2022               Content Version: 13.5  © 3217-2197 [employer], [employer]. Care instructions adapted under license by [facility] ([facility]). If you have questions about a medical condition or this instruction, always ask your healthcare professional. [address] any warranty or liability for your use of this information. DISCHARGE SUMMARY from Nurse      What to do at Home:    Pelvic rest for 6 weeks, Nothing in vagina for 6 weeks  No heavy lifting greater than 15 pounds  No push/pull motion such as sweeping/moping  No driving for 1-2 weeks, No driving if taking narcotics  No tub baths or swimming, showers only    Continue with wilbur bottle, continue to clean incision with unscented soap and water, keep clean and dry. Call Md with heavy bleeding, pain not relieved by prescribed medication, fever greater than 101, incision with signs of infection, foul smelling vaginal discharge, thoughts of harm to self or others               *  Please give a list of your current medications to your Primary Care Provider. *  Please update this list whenever your medications are discontinued, doses are      changed, or new medications (including over-the-counter products) are added. *  Please carry medication information at all times in case of emergency situations.     These are general instructions for a healthy lifestyle:    No smoking/ No tobacco products/ Avoid exposure to second hand smoke  Surgeon General's Warning:  Quitting smoking now greatly reduces serious risk to your health. Obesity, smoking, and sedentary lifestyle greatly increases your risk for illness    A healthy diet, regular physical exercise & weight monitoring are important for maintaining a healthy lifestyle    You may be retaining fluid if you have a history of heart failure or if you experience any of the following symptoms:  Weight gain of 3 pounds or more overnight or 5 pounds in a week, increased swelling in our hands or feet or shortness of breath while lying flat in bed. Please call your doctor as soon as you notice any of these symptoms; do not wait until your next office visit. The discharge information has been reviewed with the patient. The patient verbalized understanding. Discharge medications reviewed with the patient and appropriate educational materials and side effects teaching were provided.   ___________________________________________________________________________________________________________________________________

## 2023-01-09 ENCOUNTER — PATIENT MESSAGE (OUTPATIENT)
Dept: OBGYN CLINIC | Age: 23
End: 2023-01-09

## 2023-01-16 ENCOUNTER — POSTPARTUM VISIT (OUTPATIENT)
Dept: OBGYN CLINIC | Age: 23
End: 2023-01-16

## 2023-01-16 VITALS
WEIGHT: 154 LBS | BODY MASS INDEX: 30.23 KG/M2 | DIASTOLIC BLOOD PRESSURE: 72 MMHG | SYSTOLIC BLOOD PRESSURE: 114 MMHG | HEIGHT: 60 IN

## 2023-01-16 PROCEDURE — 99024 POSTOP FOLLOW-UP VISIT: CPT | Performed by: OBSTETRICS & GYNECOLOGY

## 2023-01-16 NOTE — PROGRESS NOTES
Liane Chan presents for postop visit from  about 2 weeks ago. Doing well postoperatively. with a small amount of bleeding. Reports:no fever or chills  Voiding well:yes. Bowel movements OK:yes. Breastfeeding no    Patient Active Problem List    Diagnosis Date Noted    Excessive fetal growth affecting management of pregnancy in third trimester 2022    Thrombocytopenia affecting pregnancy ([address] 2023    Normal labor 2023     delivery delivered 2023    History of vaginal bleeding 2022    Normal pregnancy, first     Lichen sclerosus         Exam: Blood pressure 114/72, height 5' (1.524 m), weight 154 lb (69.9 kg), last menstrual period 2022, not currently breastfeeding. A&OX3, NAD. Abdomen:  Non tender Incision clean, dry, intact      A/P. Stable Post op condition. Gradually increase activity. Resumption of sexual activity is not encouraged at this time.  had a vasectomy. Follow up  4 weeks.

## 2023-02-17 ENCOUNTER — POSTPARTUM VISIT (OUTPATIENT)
Dept: OBGYN CLINIC | Age: 23
End: 2023-02-17

## 2023-02-17 VITALS
BODY MASS INDEX: 30.23 KG/M2 | WEIGHT: 154 LBS | DIASTOLIC BLOOD PRESSURE: 84 MMHG | HEIGHT: 60 IN | SYSTOLIC BLOOD PRESSURE: 124 MMHG

## 2023-02-17 PROBLEM — Z34.00 NORMAL PREGNANCY, FIRST: Status: RESOLVED | Noted: 2022-07-14 | Resolved: 2023-02-17

## 2023-02-17 PROBLEM — Z37.9 NORMAL LABOR: Status: RESOLVED | Noted: 2023-01-01 | Resolved: 2023-02-17

## 2023-02-17 PROBLEM — Z87.42 HISTORY OF VAGINAL BLEEDING: Status: RESOLVED | Noted: 2022-11-30 | Resolved: 2023-02-17

## 2023-02-17 PROBLEM — O36.63X0 EXCESSIVE FETAL GROWTH AFFECTING MANAGEMENT OF PREGNANCY IN THIRD TRIMESTER: Status: RESOLVED | Noted: 2022-12-20 | Resolved: 2023-02-17

## 2023-02-17 PROBLEM — D69.6 THROMBOCYTOPENIA AFFECTING PREGNANCY (HCC): Status: RESOLVED | Noted: 2023-01-04 | Resolved: 2023-02-17

## 2023-02-17 PROBLEM — O99.119 THROMBOCYTOPENIA AFFECTING PREGNANCY (HCC): Status: RESOLVED | Noted: 2023-01-04 | Resolved: 2023-02-17

## 2023-02-17 NOTE — PROGRESS NOTES
Subjective:   Ms. Laura Rao is a 23 y.o.  y.o. Female  who is now 6 weeks postpartum. She is feeling well and happy. Method of delivery: primary  section  Pregnancy complications: none and mild thrombocytopenia. Feeding: Formula Feeding and is not experiencing problems. She currently uses abstinence  for contraception. She plans to use oral contraceptives and vasectomy, (has not cleared) for contraception. Date of last Pap smear: 10/22/21    Patient Active Problem List   Diagnosis    Lichen sclerosus          Current Outpatient Medications:     norethindrone-ethinyl estradiol-Fe (LO LOESTRIN FE) 1 MG-10 MCG / 10 MCG tablet, Take 1 tablet by mouth daily, Disp: 1 packet, Rfl: 11    acyclovir (ZOVIRAX) 800 MG tablet, Take 800 mg by mouth, Disp: , Rfl:      Allergies   Allergen Reactions    Latex Rash    Penicillins Rash and Hives          Objective:   Physical Exam:  Blood pressure 124/84, height 5' (1.524 m), weight 154 lb (69.9 kg), last menstrual period 2022, currently breastfeeding. Physical Exam: GENERAL APPEARANCE: alert, well appearing, in no apparent distress, oriented to person, place and time  BREASTS: no masses noted, no significant tenderness, no palpable axillary nodes, no skin changes  ABDOMEN POSTPARTUM: benign non-tender, without masses or organomegaly palpable and incision well-healed  EXTERNAL GENITALIA POSTPARTUM: normal, well-healed, without lesions or masses  CERVIX POSTPARTUM: Closed   UTERUS POSTPARTUM: normal size, well involuted, firm, non-tender  ADNEXA POSTPARTUM: no masses palpable and nontender  RECTAL POSTPARTUM: not indicated    Assessment/Plan:   normal postpartum exam  See orders and Patient Instructions  Contraceptive counseling for oral contraceptives (estrogen/progesterone), vasectomy. Use back until 2nd pack of Ocs and/or vasectomy cleared    Scar care discussed. Nichole Campbell was seen today for postpartum care.     Diagnoses and all orders for this visit:    Routine postpartum follow-up  -     norethindrone-ethinyl estradiol-Fe (LO LOESTRIN FE) 1 MG-10 MCG / 10 MCG tablet;  Take 1 tablet by mouth daily    Follow-up and Dispositions    Return in about 4 months (around 6/17/2023) for Annual.       reviewed diet, exercise and weight control

## 2023-02-17 NOTE — LETTER
[address] [address]  Phone: [phone]  Fax: [phone]    Floresita Weldon MD        February 17, 2023     Patient: Keyonna Platt   YOB: 2000   Date of Visit: 2/17/2023       To Whom It May Concern: It is my medical opinion that Danelle Camacho may return to full duty immediately with no restrictions. If you have any questions or concerns, please have the patient call our office.      Sincerely,        Floresita Weldon MD

## 2023-03-01 RX ORDER — VALACYCLOVIR HYDROCHLORIDE 500 MG/1
500 TABLET, FILM COATED ORAL DAILY
Qty: 30 TABLET | Refills: 4 | Status: SHIPPED | OUTPATIENT
Start: 2023-03-01

## 2023-03-08 RX ORDER — ESCITALOPRAM OXALATE 5 MG/1
5 TABLET ORAL DAILY
Qty: 30 TABLET | Refills: 1 | Status: SHIPPED | OUTPATIENT
Start: 2023-03-08 | End: 2023-04-24 | Stop reason: ALTCHOICE

## 2023-03-09 ENCOUNTER — TELEPHONE (OUTPATIENT)
Dept: OBGYN CLINIC | Age: 23
End: 2023-03-09

## 2023-03-09 NOTE — TELEPHONE ENCOUNTER
----- Message from Marshall Shin RN sent at 3/9/2023  3:14 PM EST -----  Regarding: FW: Tape on incision   He is here Monday Tuesday Wednesday it looks like  ----- Message -----  From: Bernardo Gorman MD  Sent: 3/8/2023  12:36 PM EST  To: Marshall Shin RN  Subject: RE: Tape on incision                             She needs to be seen ASAP,  In the mean time start lexapro 5mg po daily.    ----- Message -----  From: Marshall Shin RN  Sent: 3/7/2023   8:21 AM EST  To: Bernardo Gorman MD  Subject: FW: Tape on incision                             What would you like me to do for her?  ----- Message -----  From: Zahida Anahi  Sent: 3/7/2023   7:31 AM EST  To: Marda Cushing [address] [place] [address] Clinical Staff  Subject: Tape on incision                                 I know Im everywhere with messages, but I need depression medicine. My PPD has been horrible I know I mentioned it at last visit with the crying, but its just a constant thing now. Crying and just sad. My  agrees as well I need to try something because he said this isnt me. Im never sad or crying. I dont know if its because Im back at work and deal with hard stuff and not being around my son. . Im just trying not to ruin my marriage because its causing fights because he thinks I need to speak up and get medicine. No Im not suicidal, just sad 24/7. Also tired of everyone asking me why Im not smiling at work like how I normally am.     And the weight restriction isnt an issue anymore. I told them to leave it and I know my limits.

## 2023-04-04 ENCOUNTER — CLINICAL DOCUMENTATION (OUTPATIENT)
Dept: OBGYN CLINIC | Age: 23
End: 2023-04-04

## 2023-04-04 NOTE — PROGRESS NOTES
Pt called to make appointment with Dr Pearletha Gilford to discuss PP depression and medication. Pt states she lives 1 hour away and has missed 2 appt due to \"being tired\". Appt made for 4/24/23 at 10:00.

## 2023-04-24 ENCOUNTER — OFFICE VISIT (OUTPATIENT)
Dept: OBGYN CLINIC | Age: 23
End: 2023-04-24
Payer: MEDICAID

## 2023-04-24 PROCEDURE — 99214 OFFICE O/P EST MOD 30 MIN: CPT | Performed by: OBSTETRICS & GYNECOLOGY

## 2023-04-24 RX ORDER — BUPROPION HYDROCHLORIDE 150 MG/1
150 TABLET ORAL EVERY MORNING
Qty: 30 TABLET | Refills: 3 | Status: SHIPPED | OUTPATIENT
Start: 2023-04-24

## 2023-04-24 ASSESSMENT — PATIENT HEALTH QUESTIONNAIRE - PHQ9
1. LITTLE INTEREST OR PLEASURE IN DOING THINGS: 1
SUM OF ALL RESPONSES TO PHQ QUESTIONS 1-9: 2
2. FEELING DOWN, DEPRESSED OR HOPELESS: 1
SUM OF ALL RESPONSES TO PHQ QUESTIONS 1-9: 2
SUM OF ALL RESPONSES TO PHQ9 QUESTIONS 1 & 2: 2
SUM OF ALL RESPONSES TO PHQ QUESTIONS 1-9: 2
SUM OF ALL RESPONSES TO PHQ QUESTIONS 1-9: 2

## 2023-04-24 NOTE — PROGRESS NOTES
Sam Avery returns with c/o PPD. 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 Thus called here on 3/9 with c/o PPD. Mostly was just feeling stressed and sad, but no problems bondgin with and caring for baby. No HI/SI. Marriage OK , just some stress related to new parenting. Feels like the Lexapro has helped a LOT. Basically all the above symptoms have resolved. Also, had started OC's a few days after her PP Visit here. Has been gaining weight and attributed it to the LoLo despite not having weight gain with it in the past. Started the lexapro early march. Did not weigh today but has noted the weight gain at home. Sam Avery was seen today for follow-up. Diagnoses and all orders for this visit:    Postpartum depression - wean lexapro, I suspect the weight gain is more likely d/t that instead of the pills since she did not have problems in the past.  Discussed how to wean lexapro. Rstart LoLo. -     buPROPion (WELLBUTRIN XL) 150 MG extended release tablet; Take 1 tablet by mouth every morning         No follow-ups on file.     Patience Nam MD

## 2023-05-12 ENCOUNTER — PATIENT MESSAGE (OUTPATIENT)
Dept: OBGYN CLINIC | Age: 23
End: 2023-05-12

## 2023-05-12 DIAGNOSIS — A60.1 HERPES SIMPLEX INFECTION OF PERIANAL SKIN: Primary | ICD-10-CM

## 2023-05-12 RX ORDER — VALACYCLOVIR HYDROCHLORIDE 1 G/1
1000 TABLET, FILM COATED ORAL DAILY
Qty: 5 TABLET | Refills: 1 | Status: SHIPPED | OUTPATIENT
Start: 2023-05-12 | End: 2023-05-17

## 2023-05-12 NOTE — TELEPHONE ENCOUNTER
From: Evans Cast  To: Dr. Hogue Red Cloud: 5/12/2023 9:41 AM EDT  Subject: Clearence Neither. Im having a breakout. I need a refill of the big white ones thats 5xs a day I believe. The blue ones are working and its been a horrible week for me.

## 2023-05-31 ENCOUNTER — OFFICE VISIT (OUTPATIENT)
Dept: OBGYN CLINIC | Age: 23
End: 2023-05-31
Payer: MEDICAID

## 2023-05-31 VITALS
WEIGHT: 162 LBS | SYSTOLIC BLOOD PRESSURE: 112 MMHG | DIASTOLIC BLOOD PRESSURE: 74 MMHG | HEIGHT: 60 IN | BODY MASS INDEX: 31.8 KG/M2

## 2023-05-31 DIAGNOSIS — N89.8 VAGINAL ODOR: ICD-10-CM

## 2023-05-31 DIAGNOSIS — N76.0 BACTERIAL VAGINOSIS: Primary | ICD-10-CM

## 2023-05-31 DIAGNOSIS — B96.89 BACTERIAL VAGINOSIS: Primary | ICD-10-CM

## 2023-05-31 PROCEDURE — 99213 OFFICE O/P EST LOW 20 MIN: CPT | Performed by: NURSE PRACTITIONER

## 2023-05-31 RX ORDER — METRONIDAZOLE 500 MG/1
500 TABLET ORAL 2 TIMES DAILY
Qty: 14 TABLET | Refills: 0 | Status: SHIPPED | OUTPATIENT
Start: 2023-05-31 | End: 2023-06-07

## 2023-05-31 NOTE — PROGRESS NOTES
CC:   Chief Complaint   Patient presents with    Vaginal Odor       HPI:    Leonardo Altman  is a 20 y.o. , Maxine Zelaya, Patient's last menstrual period was 05/30/2023.,  who is seen for c/o vaginal odor. Patient with history of HSV-2 with most recent outbreak to genital area 2 weeks ago. Reports \"being first time it has occurred to the vaginal area. It normally always happens toward the back part of my bottom. \" States \"after I have an outbreak, I have noticed an odor which has happened before and I was treated with the gel and it went away. \"  Patient reports noticing a \"iron/fishy vaginal odor' which has been present for past 2 weeks. She denies any abdominal pain, vaginal discharge or vaginal itching. Patient states started her menstrual cycle yesterday. Contraception:  OCPs    Patient states has had irregular periods since starting OCPs. States \"I bled for 3 weeks in March (lighter flow)\", in April \"I bled for 2 weeks and flow was heavier the 2nd week in April. \" States in May, \"it has not been as heavy this time. \" Discussed how irregular cycles after starting [facility] is normal however, encouraged patient to continue to monitor and should heavy bleeding continue and be consistent to let us know and/or seek emergent care and she is agreeable with this. Patient reports having to change pads every 30 minutes yesterday however, \"it has not been that bad today. I have only had to change my pad twice so far today. \"     No intermenstrual VB/spotting, Mild dysmenorrhea  No post coital VB  No dyspareunia. Currently sexually active with , no concern for STDs-declines STD testing today.      GYN HISTORY:   As per HPI     Hx STDs: hx HSV-2, recent outbreak 2 weeks ago (genital outbreak)    Last Pap: 10/2021-negative  Hx abnormal paps: 2017 with LEEP      Current Outpatient Medications on File Prior to Visit   Medication Sig Dispense Refill    buPROPion (WELLBUTRIN XL) 150 MG extended release tablet Take 1 tablet by

## 2023-06-02 LAB
A VAGINAE DNA VAG QL NAA+PROBE: ABNORMAL SCORE
BVAB2 DNA VAG QL NAA+PROBE: ABNORMAL SCORE
C ALBICANS DNA VAG QL NAA+PROBE: NEGATIVE
C GLABRATA DNA VAG QL NAA+PROBE: NEGATIVE
MEGA1 DNA VAG QL NAA+PROBE: ABNORMAL SCORE
SPECIMEN SOURCE: ABNORMAL

## 2023-06-20 ENCOUNTER — OFFICE VISIT (OUTPATIENT)
Dept: OBGYN CLINIC | Age: 23
End: 2023-06-20
Payer: MEDICAID

## 2023-06-20 VITALS — BODY MASS INDEX: 32.22 KG/M2 | SYSTOLIC BLOOD PRESSURE: 122 MMHG | WEIGHT: 165 LBS | DIASTOLIC BLOOD PRESSURE: 82 MMHG

## 2023-06-20 DIAGNOSIS — B96.89 BACTERIAL VAGINOSIS: ICD-10-CM

## 2023-06-20 DIAGNOSIS — A60.1 HERPES SIMPLEX INFECTION OF PERIANAL SKIN: ICD-10-CM

## 2023-06-20 DIAGNOSIS — N76.0 BACTERIAL VAGINOSIS: ICD-10-CM

## 2023-06-20 DIAGNOSIS — Z01.419 WELL WOMAN EXAM WITH ROUTINE GYNECOLOGICAL EXAM: Primary | ICD-10-CM

## 2023-06-20 DIAGNOSIS — Z12.4 SCREENING FOR CERVICAL CANCER: ICD-10-CM

## 2023-06-20 PROCEDURE — 99395 PREV VISIT EST AGE 18-39: CPT | Performed by: OBSTETRICS & GYNECOLOGY

## 2023-06-20 RX ORDER — VALACYCLOVIR HYDROCHLORIDE 1 G/1
1000 TABLET, FILM COATED ORAL 2 TIMES DAILY
Qty: 10 TABLET | Refills: 5 | Status: SHIPPED | OUTPATIENT
Start: 2023-06-20 | End: 2023-06-25

## 2023-06-20 RX ORDER — METRONIDAZOLE 500 MG/1
500 TABLET ORAL 2 TIMES DAILY
Qty: 14 TABLET | Refills: 0 | Status: SHIPPED | OUTPATIENT
Start: 2023-06-20 | End: 2023-06-27

## 2023-06-20 RX ORDER — BUPROPION HYDROCHLORIDE 150 MG/1
150 TABLET ORAL EVERY MORNING
Qty: 30 TABLET | Refills: 3 | Status: SHIPPED | OUTPATIENT
Start: 2023-06-20

## 2023-06-20 RX ORDER — VALACYCLOVIR HYDROCHLORIDE 500 MG/1
500 TABLET, FILM COATED ORAL DAILY
Qty: 90 TABLET | Refills: 4 | Status: SHIPPED | OUTPATIENT
Start: 2023-06-20

## 2023-06-20 ASSESSMENT — ENCOUNTER SYMPTOMS
GASTROINTESTINAL NEGATIVE: 1
COUGH: 0
RESPIRATORY NEGATIVE: 1
SHORTNESS OF BREATH: 0
BLOOD IN STOOL: 0
EYES NEGATIVE: 1
ALLERGIC/IMMUNOLOGIC NEGATIVE: 1
ABDOMINAL PAIN: 0

## 2023-06-20 ASSESSMENT — PATIENT HEALTH QUESTIONNAIRE - PHQ9
10. IF YOU CHECKED OFF ANY PROBLEMS, HOW DIFFICULT HAVE THESE PROBLEMS MADE IT FOR YOU TO DO YOUR WORK, TAKE CARE OF THINGS AT HOME, OR GET ALONG WITH OTHER PEOPLE: 0
6. FEELING BAD ABOUT YOURSELF - OR THAT YOU ARE A FAILURE OR HAVE LET YOURSELF OR YOUR FAMILY DOWN: 0
4. FEELING TIRED OR HAVING LITTLE ENERGY: 0
SUM OF ALL RESPONSES TO PHQ QUESTIONS 1-9: 0
8. MOVING OR SPEAKING SO SLOWLY THAT OTHER PEOPLE COULD HAVE NOTICED. OR THE OPPOSITE, BEING SO FIGETY OR RESTLESS THAT YOU HAVE BEEN MOVING AROUND A LOT MORE THAN USUAL: 0
SUM OF ALL RESPONSES TO PHQ QUESTIONS 1-9: 0
SUM OF ALL RESPONSES TO PHQ QUESTIONS 1-9: 0
SUM OF ALL RESPONSES TO PHQ9 QUESTIONS 1 & 2: 0
2. FEELING DOWN, DEPRESSED OR HOPELESS: 0
5. POOR APPETITE OR OVEREATING: 0
SUM OF ALL RESPONSES TO PHQ QUESTIONS 1-9: 0
1. LITTLE INTEREST OR PLEASURE IN DOING THINGS: 0
9. THOUGHTS THAT YOU WOULD BE BETTER OFF DEAD, OR OF HURTING YOURSELF: 0
7. TROUBLE CONCENTRATING ON THINGS, SUCH AS READING THE NEWSPAPER OR WATCHING TELEVISION: 0
3. TROUBLE FALLING OR STAYING ASLEEP: 0

## 2023-06-20 NOTE — PROGRESS NOTES
Odilon Purcell  presents for [id number]. . Unknown. PL and MFM notes reviewed as applicable. Taking Prenatal Vitamins: {YES/NO:}  She is noticing:  {EPH OB complaints:72312}    No problem-specific Assessment & Plan notes found for this encounter.

## 2023-06-20 NOTE — PROGRESS NOTES
Mariia Marmolejo is 20 y.o. female, Flori Hancock , who presents today for a routine annual gynecological examination. Patient's last menstrual period was 2023. Angela Mauro Having more HSV outbreaks, about 1/month. Also c/o CS scar discomfort  Ow Doing well. No Gyn, Breast, G/U, or GI complaints. Her  had his vasectomy in December. Baby doing well    Mammogram/Pap Hx 2023   Pap Date 10/22/2021   Pap Result neg     GYN Intake Questionnaire 2023   Current Form of Contraception OCPs   Menarche 11   Period Cycle 28-30   Period Duration in Days 5   Menstrual Flow Moderate   Period Control Tampon Regular; Thin Pad   Frequency of Change for Flow Control 4 x d   Dysmenorrhea Mild   Dysmenorrhea Symptoms Breast Tenderness;Cramping   Dyspareunia None   Post-Coital Bleeding None   Date of Pap 10/22/2021   Pap result neg   STD History Herpes;HPV       Contraception: ocp  Has received Gardisil: YES    OB History:   OB History    Para Term  AB Living   1 1 1 0 0 1   SAB IAB Ectopic Molar Multiple Live Births           0 1      # Outcome Date GA Lbr Ashok/2nd Weight Sex Delivery Anes PTL Lv   1 Term 23 38w2d 03:40 / 01:57 7 lb 14.3 oz (3.58 kg) F CS-LTranv EPI N SHU         Health Maintenance Due   Topic Date Due    COVID-19 Vaccine (1) Never done    Varicella vaccine (1 of 2 - 2-dose childhood series) Never done         GYN History            Blaine Hwang  has a past medical history of Abnormal Papanicolaou smear of cervix, HSV-2 (herpes simplex virus 2) infection, and Thrombocytopenia affecting pregnancy ([facility] [address] .    Her surgeries include  has a past surgical history that includes Tonsillectomy; LEEP (2017); and  section (N/A, 2023). .    Allergies   Allergen Reactions    Latex Rash    Penicillins Rash and Hives        Her current meds are:   Current Outpatient Medications   Medication Sig    valACYclovir (VALTREX) 500 MG tablet Take 1 tablet by mouth daily    metroNIDAZOLE (FLAGYL) 500 MG

## 2023-06-23 ENCOUNTER — PATIENT MESSAGE (OUTPATIENT)
Dept: OBGYN CLINIC | Age: 23
End: 2023-06-23

## 2023-06-23 DIAGNOSIS — A60.1 HERPES SIMPLEX INFECTION OF PERIANAL SKIN: Primary | ICD-10-CM

## 2023-06-26 RX ORDER — ACYCLOVIR 50 MG/G
OINTMENT TOPICAL
Qty: 15 G | Refills: 1 | Status: SHIPPED | OUTPATIENT
Start: 2023-06-26 | End: 2023-07-03

## 2023-06-28 LAB
CYTOLOGIST CVX/VAG CYTO: NORMAL
CYTOLOGY CVX/VAG DOC THIN PREP: NORMAL
HPV REFLEX: NORMAL
Lab: NORMAL
Lab: NORMAL
PATH REPORT.FINAL DX SPEC: NORMAL
STAT OF ADQ CVX/VAG CYTO-IMP: NORMAL

## 2023-06-29 ENCOUNTER — TELEPHONE (OUTPATIENT)
Dept: OBGYN CLINIC | Age: 23
End: 2023-06-29

## 2023-12-01 ENCOUNTER — OFFICE VISIT (OUTPATIENT)
Dept: OBGYN CLINIC | Age: 23
End: 2023-12-01

## 2023-12-01 VITALS
DIASTOLIC BLOOD PRESSURE: 74 MMHG | WEIGHT: 164 LBS | SYSTOLIC BLOOD PRESSURE: 124 MMHG | HEIGHT: 60 IN | BODY MASS INDEX: 32.2 KG/M2

## 2023-12-01 DIAGNOSIS — N89.8 VAGINAL ODOR: ICD-10-CM

## 2023-12-01 DIAGNOSIS — N89.8 VAGINAL ITCHING: Primary | ICD-10-CM

## 2023-12-01 DIAGNOSIS — N76.0 ACUTE VAGINITIS: ICD-10-CM

## 2023-12-01 DIAGNOSIS — Z11.8 SCREENING EXAMINATION FOR PARASITIC INFECTION: ICD-10-CM

## 2023-12-01 DIAGNOSIS — Z11.3 SCREENING EXAMINATION FOR STD (SEXUALLY TRANSMITTED DISEASE): ICD-10-CM

## 2023-12-01 DIAGNOSIS — N89.8 VAGINAL DISCHARGE: ICD-10-CM

## 2023-12-01 DIAGNOSIS — N92.6 MISSED MENSES: ICD-10-CM

## 2023-12-01 LAB
HCG, PREGNANCY, URINE, POC: NEGATIVE
VALID INTERNAL CONTROL, POC: YES

## 2023-12-01 RX ORDER — VALACYCLOVIR HYDROCHLORIDE 1 G/1
TABLET, FILM COATED ORAL
COMMUNITY
Start: 2023-11-24

## 2023-12-01 NOTE — PROGRESS NOTES
CC:   Chief Complaint   Patient presents with    Vaginal Itching     On and off; vaginal odor; itching, burning; history of herpes         HPI:    Leonidas Warner  is a 20 y.o. , Marie Head, Patient's last menstrual period was 10/26/2023 (exact date). , who is seen for c/o vaginal itching, burning and odor. The patient informs me that she has been experiencing vaginal itching and vaginal burning that has been off and on for the past few months. States started noticing a \"yellowish\" vaginal discharge and a vaginal odor that she describes as a \"musty type odor\" over the past week. She denies fevers, chills, urinary frequency, urgency, hematuria or dysuria today. Denies any changes to soaps or laundry detergents recently. Contraception: None   Stopped taking OCPs in August.       Menses:  Q 28-30 Days x 4-7 days, Moderate  Flow, No intermenstrual VB/spotting, Mild dysmenorrhea (does not need to take OTC meds)    Sexually active with , no concern for STDs however, would like STD testing on [place] today. Denies post coital VB or dyspareunia. GYN HISTORY:  As per HPI     Hx STDs: hx HSV-2. Has not had recent outbreak. Denies prodromal symptoms today. Last Pap: 6/2023-Neg cytology  Hx abnormal paps: 2017 with LEEP      Current Outpatient Medications on File Prior to Visit   Medication Sig Dispense Refill    valACYclovir (VALTREX) 1 g tablet       buPROPion (WELLBUTRIN XL) 150 MG extended release tablet Take 1 tablet by mouth every morning 30 tablet 3    valACYclovir (VALTREX) 500 MG tablet Take 1 tablet by mouth daily (Patient not taking: Reported on 12/1/2023) 90 tablet 4    valACYclovir (VALTREX) 500 MG tablet Take 1 tablet by mouth daily (Patient not taking: Reported on 12/1/2023) 30 tablet 4     No current facility-administered medications on file prior to visit.          Past Medical History:   Diagnosis Date    Abnormal Papanicolaou smear of cervix 07/17/2017    LGSIL    HSV-2 (herpes simplex virus 2)

## 2023-12-07 LAB
A VAGINAE DNA VAG QL NAA+PROBE: ABNORMAL SCORE
BVAB2 DNA VAG QL NAA+PROBE: ABNORMAL SCORE
C ALBICANS DNA VAG QL NAA+PROBE: POSITIVE
C GLABRATA DNA VAG QL NAA+PROBE: NEGATIVE
C TRACH RRNA SPEC QL NAA+PROBE: NEGATIVE
CANDIDA KRUSEI: NEGATIVE
CANDIDA LUSITANIAE, NAA: NEGATIVE
CANDIDA PARAPSILOSIS/TROPICALIS: NEGATIVE
MEGA1 DNA VAG QL NAA+PROBE: ABNORMAL SCORE
N GONORRHOEA RRNA SPEC QL NAA+PROBE: NEGATIVE
T VAGINALIS RRNA SPEC QL NAA+PROBE: NEGATIVE

## 2024-06-24 ENCOUNTER — OFFICE VISIT (OUTPATIENT)
Dept: OBGYN CLINIC | Age: 24
End: 2024-06-24
Payer: COMMERCIAL

## 2024-06-24 VITALS
BODY MASS INDEX: 29.64 KG/M2 | HEIGHT: 60 IN | SYSTOLIC BLOOD PRESSURE: 120 MMHG | WEIGHT: 151 LBS | DIASTOLIC BLOOD PRESSURE: 72 MMHG

## 2024-06-24 DIAGNOSIS — Z01.419 WELL WOMAN EXAM WITH ROUTINE GYNECOLOGICAL EXAM: Primary | ICD-10-CM

## 2024-06-24 DIAGNOSIS — Z12.4 CERVICAL CANCER SCREENING: ICD-10-CM

## 2024-06-24 DIAGNOSIS — R53.83 OTHER FATIGUE: ICD-10-CM

## 2024-06-24 LAB — TSH W FREE THYROID IF ABNORMAL: 1.39 UIU/ML (ref 0.27–4.2)

## 2024-06-24 PROCEDURE — 99459 PELVIC EXAMINATION: CPT | Performed by: OBSTETRICS & GYNECOLOGY

## 2024-06-24 PROCEDURE — 99395 PREV VISIT EST AGE 18-39: CPT | Performed by: OBSTETRICS & GYNECOLOGY

## 2024-06-24 SDOH — ECONOMIC STABILITY: HOUSING INSECURITY
IN THE LAST 12 MONTHS, WAS THERE A TIME WHEN YOU DID NOT HAVE A STEADY PLACE TO SLEEP OR SLEPT IN A SHELTER (INCLUDING NOW)?: NO

## 2024-06-24 SDOH — ECONOMIC STABILITY: INCOME INSECURITY: HOW HARD IS IT FOR YOU TO PAY FOR THE VERY BASICS LIKE FOOD, HOUSING, MEDICAL CARE, AND HEATING?: NOT VERY HARD

## 2024-06-24 SDOH — ECONOMIC STABILITY: FOOD INSECURITY: WITHIN THE PAST 12 MONTHS, THE FOOD YOU BOUGHT JUST DIDN'T LAST AND YOU DIDN'T HAVE MONEY TO GET MORE.: NEVER TRUE

## 2024-06-24 SDOH — ECONOMIC STABILITY: FOOD INSECURITY: WITHIN THE PAST 12 MONTHS, YOU WORRIED THAT YOUR FOOD WOULD RUN OUT BEFORE YOU GOT MONEY TO BUY MORE.: NEVER TRUE

## 2024-06-24 ASSESSMENT — PATIENT HEALTH QUESTIONNAIRE - PHQ9
SUM OF ALL RESPONSES TO PHQ QUESTIONS 1-9: 6
SUM OF ALL RESPONSES TO PHQ QUESTIONS 1-9: 6
8. MOVING OR SPEAKING SO SLOWLY THAT OTHER PEOPLE COULD HAVE NOTICED. OR THE OPPOSITE, BEING SO FIGETY OR RESTLESS THAT YOU HAVE BEEN MOVING AROUND A LOT MORE THAN USUAL: NOT AT ALL
SUM OF ALL RESPONSES TO PHQ QUESTIONS 1-9: 6
7. TROUBLE CONCENTRATING ON THINGS, SUCH AS READING THE NEWSPAPER OR WATCHING TELEVISION: NOT AT ALL
4. FEELING TIRED OR HAVING LITTLE ENERGY: NEARLY EVERY DAY
SUM OF ALL RESPONSES TO PHQ9 QUESTIONS 1 & 2: 1
5. POOR APPETITE OR OVEREATING: NOT AT ALL
3. TROUBLE FALLING OR STAYING ASLEEP: SEVERAL DAYS
2. FEELING DOWN, DEPRESSED OR HOPELESS: SEVERAL DAYS
SUM OF ALL RESPONSES TO PHQ QUESTIONS 1-9: 6
10. IF YOU CHECKED OFF ANY PROBLEMS, HOW DIFFICULT HAVE THESE PROBLEMS MADE IT FOR YOU TO DO YOUR WORK, TAKE CARE OF THINGS AT HOME, OR GET ALONG WITH OTHER PEOPLE: SOMEWHAT DIFFICULT
1. LITTLE INTEREST OR PLEASURE IN DOING THINGS: NOT AT ALL
6. FEELING BAD ABOUT YOURSELF - OR THAT YOU ARE A FAILURE OR HAVE LET YOURSELF OR YOUR FAMILY DOWN: SEVERAL DAYS
9. THOUGHTS THAT YOU WOULD BE BETTER OFF DEAD, OR OF HURTING YOURSELF: NOT AT ALL

## 2024-06-24 ASSESSMENT — ENCOUNTER SYMPTOMS
COUGH: 0
ABDOMINAL PAIN: 0
ALLERGIC/IMMUNOLOGIC NEGATIVE: 1
GASTROINTESTINAL NEGATIVE: 1
SHORTNESS OF BREATH: 0
RESPIRATORY NEGATIVE: 1
EYES NEGATIVE: 1
BLOOD IN STOOL: 0

## 2024-06-24 NOTE — PROGRESS NOTES
Theo Joyner is 24 y.o. female,  , who presents today for a routine annual gynecological examination.Patient's last menstrual period was 2024 (exact date). . C/o trouble losing weight, tired a lot.  Cycles regular (off Ocs now). Also some breast/upper back discomfort, lifting and sports bra helps but not regular bra.  Wears 'C' cup. Since giving birth. Considering breast lift surgery. Ow Doing well.  No Gyn, other Breast, G/U, or GI complaints.         2024     8:00 AM 2023    10:00 AM   Mammogram/Pap Hx   Pap Date 3/20/2023 10/22/2021   Pap Result wnl neg         2024     8:00 AM 2023    10:00 AM   GYN Intake Questionnaire   Current Form of Contraception None OCPs   Menarche  11   Period Cycle 28-30 28-30   Period Duration in Days 5 5   Period Pattern Regular    Menstrual Flow  Moderate   Period Control  Tampon Regular;Thin Pad   Frequency of Change for Flow Control  4 x d   Dysmenorrhea  Mild   Dysmenorrhea Symptoms  Breast Tenderness;Cramping   Dyspareunia None None   Post-Coital Bleeding None None   Date of Pap 3/20/2023 10/22/2021   Pap result wnl neg   STD History  Herpes;HPV       Contraception: none, ok if gets pregnant  Has received Gardisil: YES    OB History:   OB History    Para Term  AB Living   1 1 1 0 0 1   SAB IAB Ectopic Molar Multiple Live Births           0 1      # Outcome Date GA Lbr Ashok/2nd Weight Sex Delivery Anes PTL Lv   1 Term 23 38w2d 03:40 / 01:57 3.58 kg (7 lb 14.3 oz) F CS-LTranv EPI N SHU         Health Maintenance Due   Topic Date Due    Hepatitis B vaccine (1 of 3 - 3-dose series) Never done    COVID-19 Vaccine (1) Never done    Varicella vaccine (1 of 2 - 2-dose childhood series) Never done    Depression Monitoring  2024         GYN History            Theo  has a past medical history of Abnormal Papanicolaou smear of cervix, HSV-2 (herpes simplex virus 2) infection, and Thrombocytopenia affecting pregnancy ([facility]).

## 2024-07-02 LAB
COLLECTION METHOD: NORMAL
CYTOLOGIST CVX/VAG CYTO: NORMAL
CYTOLOGY CVX/VAG DOC THIN PREP: NORMAL
DATE OF LMP: NORMAL
HPV REFLEX: NORMAL
Lab: NORMAL
OTHER PT INFO: NORMAL
PAP SOURCE: NORMAL
PATH REPORT.FINAL DX SPEC: NORMAL
PATHOLOGIST CVX/VAG CYTO: NORMAL
PREV CYTO INFO: NORMAL
PREV TREATMENT RESULTS: NORMAL
PREV TREATMENT: NORMAL
STAT OF ADQ CVX/VAG CYTO-IMP: NORMAL

## 2024-09-19 ENCOUNTER — OFFICE VISIT (OUTPATIENT)
Dept: OBGYN CLINIC | Age: 24
End: 2024-09-19
Payer: COMMERCIAL

## 2024-09-19 VITALS
WEIGHT: 142 LBS | DIASTOLIC BLOOD PRESSURE: 78 MMHG | SYSTOLIC BLOOD PRESSURE: 128 MMHG | BODY MASS INDEX: 27.88 KG/M2 | HEIGHT: 60 IN

## 2024-09-19 DIAGNOSIS — N89.8 VAGINAL DISCHARGE: Primary | ICD-10-CM

## 2024-09-19 DIAGNOSIS — R30.0 DYSURIA: ICD-10-CM

## 2024-09-19 LAB
AMORPHOUS CRYSTAL: NORMAL
BACTERIA URINE, POC: NORMAL
BILIRUBIN, URINE, POC: NEGATIVE
BLOOD URINE, POC: NEGATIVE
CASTS URINE, POC: NORMAL
CRYSTALS UA, POC: NORMAL
EPI CELLS URINE, POC: NORMAL
GLUCOSE URINE, POC: NEGATIVE
KETONES, URINE, POC: NEGATIVE
LEUKOCYTE ESTERASE, URINE, POC: NEGATIVE
NITRITE, URINE, POC: NEGATIVE
OTHER, URINE: NORMAL
PH, URINE, POC: 6.5 (ref 4.6–8)
PROTEIN,URINE, POC: NEGATIVE
RBC, URINE, POC: NORMAL
SPECIFIC GRAVITY, URINE, POC: 1.01 (ref 1–1.03)
URINALYSIS CLARITY, POC: CLEAR
URINALYSIS COLOR, POC: YELLOW
UROBILINOGEN, POC: NORMAL
WBC, URINE, POC: NORMAL

## 2024-09-19 PROCEDURE — 81000 URINALYSIS NONAUTO W/SCOPE: CPT | Performed by: OBSTETRICS & GYNECOLOGY

## 2024-09-19 PROCEDURE — 99459 PELVIC EXAMINATION: CPT | Performed by: OBSTETRICS & GYNECOLOGY

## 2024-09-19 PROCEDURE — 99213 OFFICE O/P EST LOW 20 MIN: CPT | Performed by: OBSTETRICS & GYNECOLOGY

## 2024-09-24 LAB
A VAGINAE DNA VAG QL NAA+PROBE: NORMAL SCORE
BVAB2 DNA VAG QL NAA+PROBE: NORMAL SCORE
C ALBICANS DNA VAG QL NAA+PROBE: NEGATIVE
C GLABRATA DNA VAG QL NAA+PROBE: NEGATIVE
MEGA1 DNA VAG QL NAA+PROBE: NORMAL SCORE
SPECIMEN SOURCE: NORMAL
T VAGINALIS RRNA SPEC QL NAA+PROBE: NEGATIVE

## 2025-03-06 ENCOUNTER — OFFICE VISIT (OUTPATIENT)
Dept: OBGYN CLINIC | Age: 25
End: 2025-03-06

## 2025-03-06 VITALS
DIASTOLIC BLOOD PRESSURE: 68 MMHG | SYSTOLIC BLOOD PRESSURE: 104 MMHG | BODY MASS INDEX: 27.68 KG/M2 | WEIGHT: 141 LBS | HEIGHT: 60 IN

## 2025-03-06 DIAGNOSIS — T19.2XXA RETAINED TAMPON NOT FOUND ON EXAMINATION: Primary | ICD-10-CM
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
## 2025-03-06 RX ORDER — PHENTERMINE HYDROCHLORIDE 37.5 MG/1
37.5 TABLET ORAL
COMMUNITY
Start: 2025-01-30

## 2025-03-06 NOTE — PROGRESS NOTES
CC:   Chief Complaint   Patient presents with    Foreign Body in Vagina       HPI:    Theo  is a 25 y.o. , , Patient's last menstrual period was 2025.,  who is seen today due concerns for possible retained tampon. The patient states \"I remember putting a tampon in before dinner last night and I know I went to the bathroom afterwards but I can't remember if I left the tampon in or not.\" States she has not felt like there is a tampon in and states \"I felt around with my fingers and looked and did not see anything but wanted to make sure.\" She denies fevers, chills, abnormal vaginal discharge, itching, odor, irritation, urinary frequency, urgency or dysuria today.     Contraception: None    Menses:  Q 30 Days x 4-7 days, Moderate  Flow, No intermenstrual VB/spotting, Mild dysmenorrhea (does not need to take OTC meds)     Sexually active with , no concern for STDs and declines STD testing today.    Denies post coital VB or dyspareunia.        GYN HISTORY:  As per HPI     Hx STDs: hx HSV-2. Has not had recent outbreak.   Denies prodromal symptoms today.     Last Pap: 2024-Neg cytology  Hx abnormal paps: 2017 with LEEP       Current Outpatient Medications on File Prior to Visit   Medication Sig Dispense Refill    phentermine (ADIPEX-P) 37.5 MG tablet Take 1 tablet by mouth every morning (before breakfast). Max Daily Amount: 37.5 mg      valACYclovir (VALTREX) 1 g tablet  (Patient not taking: Reported on 3/6/2025)      valACYclovir (VALTREX) 500 MG tablet Take 1 tablet by mouth daily (Patient not taking: Reported on 2023) 90 tablet 4    valACYclovir (VALTREX) 500 MG tablet Take 1 tablet by mouth daily (Patient not taking: Reported on 2023) 30 tablet 4     No current facility-administered medications on file prior to visit.         Past Medical History:   Diagnosis Date    Abnormal Papanicolaou smear of cervix 2017    LGSIL    HSV-2 (herpes simplex virus 2) infection

## (undated) DEVICE — CATH TRAY URETHRAL 16FR CLOSED COLLECTION BAG

## (undated) DEVICE — BARRIER ADH W3XL4IN UTER PELV ABSRB GYNECARE INTCEED

## (undated) DEVICE — SUTURE VCRL RAPIDE SZ 3-0 L36IN ABSRB UD L36MM CT-1 1/2 CIR VR944

## (undated) DEVICE — PENCIL SMK EVAC TELSCP 4.5 M TBNG

## (undated) DEVICE — Device: Brand: PORTEX

## (undated) DEVICE — PENCIL ES L3M BTTN SWCH S STL HEX LOK BLDE ELECTRD HOLSTER

## (undated) DEVICE — SUTURE MCRYL SZ 4-0 L27IN ABSRB UD L19MM PS-2 1/2 CIR PRIM Y426H

## (undated) DEVICE — AMD ANTIMICROBIAL GAUZE SPONGES,12 PLY USP TYPE VII, 0.2% POLYHEXAMETHYLENE BIGUANIDE HCI (PHMB): Brand: CURITY

## (undated) DEVICE — SOLUTION IRRIG 1000ML H2O STRL BLT

## (undated) DEVICE — 3M™ STERI-STRIP™ REINFORCED ADHESIVE SKIN CLOSURES, R1547, 1/2 IN X 4 IN (12 MM X 100 MM), 6 STRIPS/ENVELOPE: Brand: 3M™ STERI-STRIP™

## (undated) DEVICE — SURGICAL PROCEDURE PACK C SECT CDS

## (undated) DEVICE — SOLUTION IV 1000ML 0.9% SOD CHL

## (undated) DEVICE — AMD ANTIMICROBIAL NON-ADHERENT PAD,0.2% POLYHEXAMETHYLENE BIGUANIDE HCI (PHMB): Brand: TELFA

## (undated) DEVICE — SUTURE VCRL SZ 0 L36IN ABSRB UD L36MM CT-1 1/2 CIR J946H

## (undated) DEVICE — SUTURE VCRL SZ 0 L36IN ABSRB UD CT-1 L36MM 1/2 CIR TAPR PNT VCP946H

## (undated) DEVICE — TUBING, SUCTION, 1/4" X 10', STRAIGHT: Brand: MEDLINE

## (undated) DEVICE — STERILE POLYISOPRENE POWDER-FREE SURGICAL GLOVES: Brand: PROTEXIS

## (undated) DEVICE — SUTURE MCRYL SZ 3-0 L27IN ABSRB UD L60MM KS STR REV CUT Y523H

## (undated) DEVICE — SUTURE PDS II SZ 0 L27IN ABSRB VLT L36MM CT-1 1/2 CIR Z340H

## (undated) DEVICE — TRAY PREP DRY W/ PREM GLV 2 APPL 6 SPNG 2 UNDPD 1 OVERWRAP

## (undated) DEVICE — ELECTRODE PT RET AD L9FT HI MOIST COND ADH HYDRGEL CORDED

## (undated) DEVICE — KENDALL SCD EXPRESS SLEEVES, KNEE LENGTH, MEDIUM: Brand: KENDALL SCD